# Patient Record
Sex: FEMALE | Race: WHITE | Employment: UNEMPLOYED | URBAN - METROPOLITAN AREA
[De-identification: names, ages, dates, MRNs, and addresses within clinical notes are randomized per-mention and may not be internally consistent; named-entity substitution may affect disease eponyms.]

---

## 2017-10-05 ENCOUNTER — OFFICE VISIT (OUTPATIENT)
Dept: OBGYN | Age: 57
End: 2017-10-05

## 2017-10-05 VITALS
WEIGHT: 182 LBS | DIASTOLIC BLOOD PRESSURE: 76 MMHG | HEIGHT: 64 IN | SYSTOLIC BLOOD PRESSURE: 118 MMHG | BODY MASS INDEX: 31.07 KG/M2

## 2017-10-05 DIAGNOSIS — Z12.31 SCREENING MAMMOGRAM, ENCOUNTER FOR: ICD-10-CM

## 2017-10-05 DIAGNOSIS — Z11.51 SCREENING FOR HPV (HUMAN PAPILLOMAVIRUS): ICD-10-CM

## 2017-10-05 DIAGNOSIS — Z01.419 WELL WOMAN EXAM WITH ROUTINE GYNECOLOGICAL EXAM: Primary | ICD-10-CM

## 2017-10-05 PROCEDURE — 99396 PREV VISIT EST AGE 40-64: CPT | Performed by: OBSTETRICS & GYNECOLOGY

## 2017-10-05 NOTE — MR AVS SNAPSHOT
After Visit Summary             Jackelyn Garcia   10/5/2017 12:40 PM   Office Visit    Description:  Female : 1960   Provider:  Brenda Pearson MD   Department:  [facility]              Your Follow-Up and Future Appointments         Below is a list of your follow-up and future appointments. This may not be a complete list as you may have made appointments directly with providers that we are not aware of or your providers may have made some for you. Please call your providers to confirm appointments. It is important to keep your appointments. Please bring your current insurance card, photo ID, co-pay, and all medication bottles to your appointment. If self-pay, payment is expected at the time of service. Your To-Do List     Future Appointments Provider Department Dept Phone    10/8/2018 1:00 PM Brenda Pearson MD formerly [facility] [phone]    Future Orders Complete By Expires    EYAL DIGITAL SCREEN W CAD BILATERAL [ Custom]  10/5/2017 2018    PAP SMEAR [LAB4 Custom]  10/5/2017 10/5/2018    Comments:    Patient History:    No LMP recorded. Patient is postmenopausal.  OBGYN Status: Postmenopausal  Past Surgical History:  No date:  SECTION      Smoking status: Current Every Day Smoker                                                   Packs/day: 0.00      Years: 0.00         Types: Cigarettes  Smokeless status: Never Used                          Follow-Up    Return in about 1 year (around 10/5/2018) for annual.         Information from Your Visit        Department     Name Address Phone Fax    [facility] [address] [phone]      You Were Seen for:         Comments    Well woman exam with routine gynecological exam   [[id number]]         Vital Signs     Blood Pressure Height Weight Breastfeeding?  Body Mass Index Smoking Status    118/76 5' 4\" (1.626 m) 182 lb (82.6 kg) No 31.24 kg/m2 Current Every Day Smoker

## 2017-10-05 NOTE — PROGRESS NOTES
Other Topics Concern    Not on file     Social History Narrative         MEDICATIONS:  No current outpatient prescriptions on file prior to visit. No current facility-administered medications on file prior to visit. ALLERGIES:  Allergies as of 10/05/2017 - Review Complete 10/05/2017   Allergen Reaction Noted    Pcn [penicillins]  04/21/2015           Gynecologic History:     No LMP recorded. Patient is postmenopausal.      ________________________________________________________________________  REVIEW OF SYSTEMS:       A minimum of an eleven point review of systems was completed. Review Of Systems (11 point):  Constitutional: No fever, chills or malaise; No weight change or fatigue  Head and Eyes: No vision, Headache, Dizziness or trauma in last 12 months  ENT ROS: No hearing, Tinnitis, sinus or taste problems  Hematological and Lymphatic ROS: No Lymphoma, Von Willebrand's, Hemophillia or Bleeding History  Psych ROS: No Depression, Homicidal thoughts,suicidal thoughts, or anxiety  Breast ROS: No prior breast abnormalities or lumps  Respiratory ROS: No SOB, Pneumoniae,Cough, or Pulmonary Embolism History  Cardiovascular ROS: No Chest Pain with Exertion, Palpitations, Syncope, Edema, Arrhythmia  Gastrointestinal ROS: No Indigestion, Heartburn, Nausea, Vomiting, Diarrhea, Constipation,or Bowel Changes; No Bloody Stools or melena  Genito-Urinary ROS: No Dysuria, Hematuria or Nocturia.  No Urinary Incontinence or Vaginal Discharge  Musculoskeletal ROS: No Arthralgia, Gout,Osteoporosis or Rheumatism  Neurological ROS: No CVA, Migraines, Epilepsy, Seizure Hx, or Limb Weakness  Dermatological ROS: No Rash, Itching, Hives, Mole Changes or Cancer                                                                                                                                                 PHYSICAL Exam:    Constitutional:  Vitals:    10/05/17 1302   BP: 118/76   Weight: 182 lb (82.6 kg)   Height: 5' 4\"

## 2017-10-12 DIAGNOSIS — Z11.51 SCREENING FOR HPV (HUMAN PAPILLOMAVIRUS): ICD-10-CM

## 2017-10-12 DIAGNOSIS — Z01.419 WELL WOMAN EXAM WITH ROUTINE GYNECOLOGICAL EXAM: ICD-10-CM

## 2018-10-08 ENCOUNTER — OFFICE VISIT (OUTPATIENT)
Dept: OBGYN CLINIC | Age: 58
End: 2018-10-08
Payer: COMMERCIAL

## 2018-10-08 VITALS
BODY MASS INDEX: 29.02 KG/M2 | DIASTOLIC BLOOD PRESSURE: 70 MMHG | SYSTOLIC BLOOD PRESSURE: 124 MMHG | HEIGHT: 64 IN | WEIGHT: 170 LBS

## 2018-10-08 DIAGNOSIS — Z12.11 COLON CANCER SCREENING: ICD-10-CM

## 2018-10-08 DIAGNOSIS — Z12.31 SCREENING MAMMOGRAM, ENCOUNTER FOR: ICD-10-CM

## 2018-10-08 DIAGNOSIS — Z11.51 SCREENING FOR HPV (HUMAN PAPILLOMAVIRUS): ICD-10-CM

## 2018-10-08 DIAGNOSIS — Z01.419 WELL WOMAN EXAM WITH ROUTINE GYNECOLOGICAL EXAM: Primary | ICD-10-CM

## 2018-10-08 PROCEDURE — 99396 PREV VISIT EST AGE 40-64: CPT | Performed by: OBSTETRICS & GYNECOLOGY

## 2018-10-08 ASSESSMENT — PATIENT HEALTH QUESTIONNAIRE - PHQ9
SUM OF ALL RESPONSES TO PHQ QUESTIONS 1-9: 0
SUM OF ALL RESPONSES TO PHQ9 QUESTIONS 1 & 2: 0
SUM OF ALL RESPONSES TO PHQ QUESTIONS 1-9: 0
1. LITTLE INTEREST OR PLEASURE IN DOING THINGS: 0
2. FEELING DOWN, DEPRESSED OR HOPELESS: 0

## 2018-10-08 ASSESSMENT — ENCOUNTER SYMPTOMS
WHEEZING: 0
NAUSEA: 0
BLOOD IN STOOL: 0
CONSTIPATION: 0
ABDOMINAL PAIN: 0
VOMITING: 0
SINUS PRESSURE: 0
COLOR CHANGE: 0
SHORTNESS OF BREATH: 0
COUGH: 0

## 2018-10-08 NOTE — PROGRESS NOTES
History and Physical  [facility]   [address]  P: [phone] / F: [phone]  Srinath Pritchett  10/8/2018              59 y.o. Chief Complaint   Patient presents with    Annual Exam     last pap 10-9-17,wnl no complaints.  Student     ok        /70   Ht 5' 4\" (1.626 m)   Wt 170 lb (77.1 kg)   BMI 29.18 kg/m²   Alllergies:  Pcn [penicillins]               Primary Care Physician: Magdi Yang MD    HPI : Srinath Pritchett is a 59 y.o. female [id number] The patient was seen and examined. She has no chief complaint today and is here for her annual exam.  Her bowels are regular. There are no voiding complaints. She denies any bloating. She denies vaginal discharge and was counseled on STD's and the need for barrier contraception. All ?s answered. .  ________________________________________________________________________  Obstetric History       T0      L4     SAB0   TAB1   Ectopic0   Molar0   Multiple0   Live Births4       # Outcome Date GA Lbr Jose/2nd Weight Sex Delivery Anes PTL Lv   5      F CS-LTranv   SONAL   4      F Vag-Spont   SONAL   3      F Vag-Spont   SONAL   2      M Vag-Spont   SONAL   1 TAB                 History reviewed. No pertinent past medical history. Past Surgical History:   Procedure Laterality Date     SECTION       Family History   Problem Relation Age of Onset   [facility] Breast Cancer Mother          at 76     Social History     Social History    Marital status:      Spouse name: N/A    Number of children: N/A    Years of education: N/A     Occupational History    Not on file.      Social History Main Topics    Smoking status: Current Every Day Smoker     Types: Cigarettes    Smokeless tobacco: Never Used    Alcohol use 0.0 oz/week      Comment: occasionally  Drug use: No    Sexual activity: Yes     Partners: Male     Other Topics Concern    Not on file     Social History Narrative    No narrative on file         MEDICATIONS:  No current outpatient prescriptions on file prior to visit. No current facility-administered medications on file prior to visit. ALLERGIES:  Allergies as of 10/08/2018 - Review Complete 10/08/2018   Allergen Reaction Noted    Pcn [penicillins]  04/21/2015           Gynecologic History:     No LMP recorded. Patient is postmenopausal.      ________________________________________________________________________  REVIEW OF SYSTEMS:       Review of Systems   Constitutional: Negative for chills, fatigue, fever and unexpected weight change. HENT: Negative for hearing loss, sinus pressure and tinnitus. Eyes: Negative for visual disturbance. Respiratory: Negative for cough, shortness of breath and wheezing. Cardiovascular: Negative for chest pain, palpitations and leg swelling. Gastrointestinal: Negative for abdominal pain, blood in stool, constipation, nausea and vomiting. Genitourinary: Negative for difficulty urinating, dysuria, flank pain, hematuria, pelvic pain and vaginal discharge. Musculoskeletal: Negative for arthralgias and neck stiffness. Skin: Negative for color change, pallor and rash. Allergic/Immunologic: Negative for food allergies. Neurological: Negative for dizziness, speech difficulty, weakness and numbness. Psychiatric/Behavioral: Negative for behavioral problems, self-injury and suicidal ideas. The patient is not nervous/anxious. PHYSICAL Exam:    Constitutional:  Vitals:    10/08/18 1318   BP: 124/70   Weight: 170 lb (77.1 kg)   Height: 5' 4\" (1.626 m)       Physical Exam   Constitutional: She is oriented to person, place, and time.  She appears

## 2018-10-17 DIAGNOSIS — Z11.51 SCREENING FOR HPV (HUMAN PAPILLOMAVIRUS): ICD-10-CM

## 2018-10-17 DIAGNOSIS — Z01.419 WELL WOMAN EXAM WITH ROUTINE GYNECOLOGICAL EXAM: ICD-10-CM

## 2018-11-07 ENCOUNTER — TELEPHONE (OUTPATIENT)
Dept: ENDOSCOPY | Age: 58
End: 2018-11-07

## 2018-11-28 ENCOUNTER — OUTSIDE SERVICES (OUTPATIENT)
Dept: GASTROENTEROLOGY | Age: 58
End: 2018-11-28
Payer: COMMERCIAL

## 2018-11-28 DIAGNOSIS — Z12.11 COLON CANCER SCREENING: Primary | ICD-10-CM

## 2018-11-28 PROCEDURE — 45385 COLONOSCOPY W/LESION REMOVAL: CPT | Performed by: INTERNAL MEDICINE

## 2018-11-28 NOTE — OP NOTE
Colonoscopy Procedure Note      Patient: Lara Saxena  : 1960    Procedure: Colonoscopy with polypectomy (cold snare)    Date:  2018    Surgeon:  Mo Hanna MD    Referring Physician:  Sheron Stewart MD    Preoperative Diagnosis:  Screening colonoscopy    Postoperative Diagnosis:  5 mm ascending colon polyp, otherwise normal colon    Consent:  The patient or their legal guardian has signed a consent, and is aware of the potential risks, benefits, alternatives, and potential complications of this procedure. These include, but are not limited to hemorrhage, bleeding, post procedural pain, perforation, phlebitis, aspiration, hypotension, hypoxia, cardiovascular events such as arryhthmia, and possibly death. Additionally, the possibility of missed colonic polyps and interval colon cancer was discussed in the consent. Anesthesia:  MAC    Procedure: An informed consent was obtained from the patient after explanation of indications, benefits, possible risks and complications of the procedure. The patient was then taken to the endoscopy suite, placed in the left lateral decubitus position, and the above IV anesthesia was administered. A digital rectal examination was performed and revealed negative without mass, lesions or tenderness. The Olympus video colonoscope was placed in the patient's rectum under digital direction and advanced to the cecum. The cecum was identified by characteristic anatomy and confirmed with presence ileo-cecal valve, appendical orifice and transillumination of right lower quadrant. Photo documentation of cecum was performed. The prep was good. The scope was then withdrawn back through the cecum, ascending, transverse, descending, sigmoid colon, and rectum.   Careful circumferential examination of the mucosa in these areas demonstrated:    FINDINGS:  Cecum: Normal.  Ascending Colon: 5 mm sessile polyp noted, removed completely with cold snare, specimen sent

## 2023-11-03 DIAGNOSIS — I10 ESSENTIAL (PRIMARY) HYPERTENSION: ICD-10-CM

## 2023-11-13 DIAGNOSIS — Z12.31 BREAST CANCER SCREENING BY MAMMOGRAM: ICD-10-CM

## 2023-11-20 DIAGNOSIS — I10 BENIGN ESSENTIAL HYPERTENSION: ICD-10-CM

## 2023-11-20 PROBLEM — R05.9 COUGH: Status: ACTIVE | Noted: 2023-11-20

## 2023-11-20 PROBLEM — N90.7 VULVAR CYST: Status: ACTIVE | Noted: 2023-11-20

## 2023-11-20 PROBLEM — E66.3 OVERWEIGHT (BMI 25.0-29.9): Status: ACTIVE | Noted: 2023-11-20

## 2023-11-20 PROBLEM — F17.200 CURRENT EVERY DAY SMOKER: Status: ACTIVE | Noted: 2023-11-20

## 2023-11-20 PROBLEM — R21 RASH: Status: ACTIVE | Noted: 2023-11-20

## 2023-11-20 PROBLEM — R00.2 PALPITATIONS: Status: ACTIVE | Noted: 2023-11-20

## 2023-11-20 PROBLEM — R92.30 DENSE BREAST TISSUE ON MAMMOGRAM: Status: ACTIVE | Noted: 2023-11-20

## 2023-11-20 PROBLEM — J20.9 ACUTE BRONCHITIS: Status: RESOLVED | Noted: 2023-11-20 | Resolved: 2023-11-20

## 2023-11-20 RX ORDER — ERGOCALCIFEROL 1.25 MG/1
1 CAPSULE ORAL
COMMUNITY
Start: 2023-05-08 | End: 2023-12-12 | Stop reason: ALTCHOICE

## 2023-11-20 RX ORDER — LOSARTAN POTASSIUM 25 MG/1
25 TABLET ORAL DAILY
COMMUNITY
Start: 2023-08-03 | End: 2023-11-22 | Stop reason: SDUPTHER

## 2023-11-21 RX ORDER — LOSARTAN POTASSIUM 25 MG/1
25 TABLET ORAL DAILY
Qty: 90 TABLET | Refills: 3 | OUTPATIENT
Start: 2023-11-21

## 2023-11-21 RX ORDER — LOSARTAN POTASSIUM 25 MG/1
25 TABLET ORAL DAILY
Qty: 90 TABLET | Refills: 3 | OUTPATIENT
Start: 2023-11-21 | End: 2024-11-20

## 2023-11-22 DIAGNOSIS — I10 BENIGN ESSENTIAL HYPERTENSION: ICD-10-CM

## 2023-11-22 RX ORDER — LOSARTAN POTASSIUM 25 MG/1
25 TABLET ORAL DAILY
Qty: 90 TABLET | Refills: 3 | Status: SHIPPED | OUTPATIENT
Start: 2023-11-22 | End: 2024-02-21 | Stop reason: ALTCHOICE

## 2023-12-12 ENCOUNTER — OFFICE VISIT (OUTPATIENT)
Dept: PRIMARY CARE | Facility: CLINIC | Age: 63
End: 2023-12-12
Payer: MEDICARE

## 2023-12-12 VITALS
OXYGEN SATURATION: 99 % | WEIGHT: 175.2 LBS | BODY MASS INDEX: 29.91 KG/M2 | HEART RATE: 61 BPM | TEMPERATURE: 96.9 F | RESPIRATION RATE: 16 BRPM | DIASTOLIC BLOOD PRESSURE: 82 MMHG | SYSTOLIC BLOOD PRESSURE: 138 MMHG | HEIGHT: 64 IN

## 2023-12-12 DIAGNOSIS — I10 BENIGN ESSENTIAL HYPERTENSION: ICD-10-CM

## 2023-12-12 DIAGNOSIS — Z13.220 LIPID SCREENING: ICD-10-CM

## 2023-12-12 DIAGNOSIS — E53.8 VITAMIN B12 DEFICIENCY: ICD-10-CM

## 2023-12-12 PROBLEM — Z76.89 ENCOUNTER TO ESTABLISH CARE WITH NEW DOCTOR: Status: ACTIVE | Noted: 2023-12-12

## 2023-12-12 PROCEDURE — 1160F RVW MEDS BY RX/DR IN RCRD: CPT | Performed by: FAMILY MEDICINE

## 2023-12-12 PROCEDURE — 1159F MED LIST DOCD IN RCRD: CPT | Performed by: FAMILY MEDICINE

## 2023-12-12 PROCEDURE — 99213 OFFICE O/P EST LOW 20 MIN: CPT | Performed by: FAMILY MEDICINE

## 2023-12-12 PROCEDURE — 3078F DIAST BP <80 MM HG: CPT | Performed by: FAMILY MEDICINE

## 2023-12-12 PROCEDURE — 3075F SYST BP GE 130 - 139MM HG: CPT | Performed by: FAMILY MEDICINE

## 2023-12-12 PROCEDURE — 1036F TOBACCO NON-USER: CPT | Performed by: FAMILY MEDICINE

## 2023-12-12 NOTE — PROGRESS NOTES
"Subjective   Patient ID: Sommer Lewis is a 63 y.o. female who presents for Establish Care and thumb issues.  HPI    Establish care  Last PCP Dr Armen Le  Reason for leaving retired  How did you hear about us pt daughter comes here     Due for labs end of January     Taking current medications which were reviewed.  Problem list discussed.    Pt is having right thumb pain  Ongoing for 1 year  Pt states that the gets very sore and shaky     Overall doing well.  Eating okay.  Staying active.    Has no other new problem /question.     ROS  Constitutional- No activity change. No appetite change.  Eyes- Denies vision changes.  Respiratory- No shortness of breath.  Cardiovascular- No palpitations. No chest pain.  GI- No nausea or vomiting. No diarrhea or constipation. Denies abdominal pain.  Musculoskeletal- Denies joint swelling.  Extremities- No edema.  Neurological- Denies headaches. Denies dizziness.  Skin- No rashes.  Psychiatric/Behavioral- Denies significant anxiety, or depressed mood.     Objective     /82   Pulse 61   Temp 36.1 °C (96.9 °F) (Temporal)   Resp 16   Ht 1.626 m (5' 4\")   Wt 79.5 kg (175 lb 3.2 oz)   SpO2 99%   BMI 30.07 kg/m²     Allergies   Allergen Reactions    Azithromycin Unknown    Moxifloxacin Unknown    Penicillins Unknown       Constitutional-- Well-nourished.  No distress  Head- unremarkable.  Eyes- PERRL.  Conjunctiva normal.  Nose- Normal.  No rhinorrhea noted.  Throat- Oropharynx is clear and moist.  Neck- Supple with no thyromegaly.  No significant cervical adenopathy noted.  Pulmonary/Chest- Breath sounds normal with normal effort.  No wheezing.  Heart- Regular rate and rhythm.  No murmur.  Abdomen- Soft and non-tender.  No masses noted.  Musculoskeletal- Normal ROM.  No significant joint swelling  Extremities- No edema.   Neurological- Alert.  No noted deficits.  Skin- Warm.  No rashes.  Psychiatric/Behavioral- Mood and affect normal.  Behavior normal. "     Assessment/Plan   1. Benign essential hypertension  CBC and Auto Differential    Comprehensive Metabolic Panel      2. Lipid screening  Lipid Panel      3. Vitamin B12 deficiency               Long talk. Treatment options reviewed.    Discussed health maintenance. Educated the patient on preventive care.    Discussed thumb pain.  Will continue to monitor.      Advised patient to maintain appointments with specialists as scheduled.      Continue and take your medications as prescribed.    Health Maintenance issues discussed.    Importance of healthy diet and regular exercise regimen discussed.    We will contact you with any test results ordered. If you do not hear from us, please contact.    Follow-up as instructed or sooner if any problems or symptoms do not resolve as expected.    Scribe Attestation  By signing my name below, Baylee MARKS Scribe   attest that this documentation has been prepared under the direction and in the presence of Adin Jones MD.

## 2023-12-12 NOTE — PROGRESS NOTES
Subjective   Patient ID: Sommer Lewis is a 63 y.o. female who presents for No chief complaint on file..  [facility]    Establish care  Last PCP --  Reason for leaving --  How did you hear about us --     Due for labs end of January     Taking current medications which were reviewed.  Problem list discussed.    Overall doing well.  Eating okay.  Staying active.    Has no other new problem /question.     ROS  Constitutional- No activity change. No appetite change.  Eyes- Denies vision changes.  Respiratory- No shortness of breath.  Cardiovascular- No palpitations. No chest pain.  GI- No nausea or vomiting. No diarrhea or constipation. Denies abdominal pain.  Musculoskeletal- Denies joint swelling.  Extremities- No edema.  Neurological- Denies headaches. Denies dizziness.  Skin- No rashes.  Psychiatric/Behavioral- Denies significant anxiety, or depressed mood.     Objective     There were no vitals taken for this visit.    Allergies   Allergen Reactions   • Azithromycin Unknown   • Moxifloxacin Unknown   • Penicillins Unknown       Constitutional-- Well-nourished.  No distress  Head- unremarkable.  Ears- TMs clear.  Eyes- PERRL.  Conjunctiva normal.  Nose- Normal.  No rhinorrhea noted.  Throat- Oropharynx is clear and moist.  Neck- Supple with no thyromegaly.  No significant cervical adenopathy noted.  Pulmonary/Chest- Breath sounds normal with normal effort.  No wheezing.  Heart- Regular rate and rhythm.  No murmur.  Abdomen- Soft and non-tender.  No masses noted.  Musculoskeletal- Normal ROM.  No significant joint swelling  Extremities- No edema.   Neurological- Alert.  No noted deficits.  Skin- Warm.  No rashes.  Psychiatric/Behavioral- Mood and affect normal.  Behavior normal.     Assessment/Plan   No diagnosis found.       Long talk. Treatment options reviewed.    Continue and take your medications as prescribed.    Health Maintenance issues discussed.    Importance of healthy diet and regular exercise regimen  discussed.    We will contact you with any test results ordered. If you do not hear from us, please contact.    Follow-up as instructed or sooner if any problems or symptoms do not resolve as expected.

## 2024-01-10 ENCOUNTER — HOSPITAL ENCOUNTER (OUTPATIENT)
Dept: RADIOLOGY | Facility: HOSPITAL | Age: 64
Discharge: HOME | End: 2024-01-10
Payer: MEDICARE

## 2024-01-10 DIAGNOSIS — Z12.31 BREAST CANCER SCREENING BY MAMMOGRAM: ICD-10-CM

## 2024-01-10 PROCEDURE — 77067 SCR MAMMO BI INCL CAD: CPT | Performed by: RADIOLOGY

## 2024-01-10 PROCEDURE — 77063 BREAST TOMOSYNTHESIS BI: CPT | Performed by: RADIOLOGY

## 2024-01-10 PROCEDURE — 77067 SCR MAMMO BI INCL CAD: CPT

## 2024-01-11 ENCOUNTER — TELEPHONE (OUTPATIENT)
Dept: PRIMARY CARE | Facility: CLINIC | Age: 64
End: 2024-01-11
Payer: MEDICARE

## 2024-01-11 NOTE — TELEPHONE ENCOUNTER
----- Message from Adin Jones MD sent at 1/11/2024  7:53 AM EST -----  Mammogram is within normal limits.  Repeat in 1 year.  Please let her know and record

## 2024-02-21 ENCOUNTER — OFFICE VISIT (OUTPATIENT)
Dept: CARDIOLOGY | Facility: CLINIC | Age: 64
End: 2024-02-21
Payer: MEDICARE

## 2024-02-21 VITALS
WEIGHT: 180.9 LBS | HEIGHT: 65 IN | HEART RATE: 60 BPM | SYSTOLIC BLOOD PRESSURE: 150 MMHG | DIASTOLIC BLOOD PRESSURE: 80 MMHG | BODY MASS INDEX: 30.14 KG/M2

## 2024-02-21 DIAGNOSIS — Z87.891 FORMER SMOKER: ICD-10-CM

## 2024-02-21 DIAGNOSIS — R09.89 BRUIT OF RIGHT CAROTID ARTERY: ICD-10-CM

## 2024-02-21 DIAGNOSIS — I10 BENIGN ESSENTIAL HYPERTENSION: ICD-10-CM

## 2024-02-21 DIAGNOSIS — Z13.220 SCREENING CHOLESTEROL LEVEL: ICD-10-CM

## 2024-02-21 DIAGNOSIS — R00.2 PALPITATIONS: ICD-10-CM

## 2024-02-21 PROBLEM — F17.200 CURRENT EVERY DAY SMOKER: Status: RESOLVED | Noted: 2023-11-20 | Resolved: 2024-02-21

## 2024-02-21 PROCEDURE — 99214 OFFICE O/P EST MOD 30 MIN: CPT | Performed by: INTERNAL MEDICINE

## 2024-02-21 PROCEDURE — 3079F DIAST BP 80-89 MM HG: CPT | Performed by: INTERNAL MEDICINE

## 2024-02-21 PROCEDURE — 1159F MED LIST DOCD IN RCRD: CPT | Performed by: INTERNAL MEDICINE

## 2024-02-21 PROCEDURE — 1036F TOBACCO NON-USER: CPT | Performed by: INTERNAL MEDICINE

## 2024-02-21 PROCEDURE — 3077F SYST BP >= 140 MM HG: CPT | Performed by: INTERNAL MEDICINE

## 2024-02-21 RX ORDER — LANOLIN ALCOHOL/MO/W.PET/CERES
1000 CREAM (GRAM) TOPICAL WEEKLY
COMMUNITY

## 2024-02-21 RX ORDER — LOSARTAN POTASSIUM 50 MG/1
50 TABLET ORAL DAILY
Qty: 90 TABLET | Refills: 3 | Status: SHIPPED | OUTPATIENT
Start: 2024-02-21 | End: 2024-05-29 | Stop reason: SDUPTHER

## 2024-02-21 ASSESSMENT — ENCOUNTER SYMPTOMS
CARDIOVASCULAR NEGATIVE: 1
RESPIRATORY NEGATIVE: 1
CONSTITUTIONAL NEGATIVE: 1
NEUROLOGICAL NEGATIVE: 1

## 2024-02-21 NOTE — PROGRESS NOTES
CARDIOLOGY OFFICE VISIT      CHIEF COMPLAINT  Chief Complaint   Patient presents with    Follow-up     1 year follow up        HISTORY OF PRESENT ILLNESS  Sommer Lewis is a 63 y.o. year old female patient with history of benign essential hypertension and tobacco use who recently stopped smoking about 3 months ago.  She has a history of significant electrolyte abnormalities including hyponatremia and hypokalemia secondary to thiazide diuretics.  She has been doing well with no chest pain or significant shortness of breath.  She also denies orthopnea proximal nocturnal dyspnea.  Last lipid profile shows LDL is 95 with an HDL of 58 and triglycerides 51    ASSESSMENT AND PLAN  1.  Hypertension: Unfortunately blood pressure is suboptimally controlled since she gained some weight after stopping smoking.  We will increase losartan to 50 mg daily and follow-up for blood pressure check as scheduled.  2.  Overweight: She is encouraged to lose weight and exercise.    Problem List Items Addressed This Visit       Benign essential hypertension    Palpitations    Former smoker     Other Visit Diagnoses       Bruit of right carotid artery        Screening cholesterol level                Recent Cardiovascular Testing:    Echo-  Stress-  Cath-  Carotid Ultrasound-    Past Medical History  Past Medical History:   Diagnosis Date    Acute bronchitis 2023    Hypertension     Other specified symptoms and signs involving the circulatory and respiratory systems     Labile blood pressure    Personal history of other diseases of the respiratory system 2022    History of acute bronchitis       Social History  Social History     Tobacco Use    Smoking status: Former     Packs/day: 1.00     Years: 40.00     Additional pack years: 0.00     Total pack years: 40.00     Types: Cigarettes     Quit date: 2023     Years since quittin.2    Smokeless tobacco: Never   Substance Use Topics    Alcohol use: Not Currently     "Drug use: Never       Family History     Family History   Problem Relation Name Age of Onset    Breast cancer Mother      Diabetes Father          Allergies:  Allergies   Allergen Reactions    Azithromycin Unknown    Hydrochlorothiazide Other    Moxifloxacin Unknown    Penicillins Unknown        Outpatient Medications:  Current Outpatient Medications   Medication Instructions    cyanocobalamin (VITAMIN B-12) 1,000 mcg, oral, Weekly        Recent Lab Results:    CBC:   Lab Results   Component Value Date    WBC 8.3 01/26/2023    RBC 4.74 01/26/2023    HGB 14.6 01/26/2023    HCT 44.4 01/26/2023     01/26/2023        CMP:    Lab Results   Component Value Date     01/26/2023    K 3.8 01/26/2023     01/26/2023    CO2 29 01/26/2023    BUN 8 01/26/2023    CREATININE 0.68 01/26/2023    GLUCOSE 86 01/26/2023    CALCIUM 9.8 01/26/2023       Magnesium:    No results found for: \"MG\"    Lipid Profile:    Lab Results   Component Value Date    TRIG 51 08/30/2021    HDL 58.0 08/30/2021       TSH:    Lab Results   Component Value Date    TSH 2.47 01/26/2023       BNP:   No results found for: \"BNP\"     PT/INR:    No results found for: \"PROTIME\", \"INR\"    HgBA1c:    No results found for: \"HGBA1C\"    BMP:  Lab Results   Component Value Date     01/26/2023     10/04/2022     (L) 07/28/2022    K 3.8 01/26/2023    K 4.1 10/04/2022    K 3.2 (L) 07/28/2022     01/26/2023     (H) 10/04/2022    CL 97 (L) 07/28/2022    CO2 29 01/26/2023    CO2 25 10/04/2022    CO2 27 07/28/2022    BUN 8 01/26/2023    BUN 7 10/04/2022    BUN 11 07/28/2022    CREATININE 0.68 01/26/2023    CREATININE 0.75 10/04/2022    CREATININE 0.70 07/28/2022       CBC:  Lab Results   Component Value Date    WBC 8.3 01/26/2023    WBC 7.1 07/28/2022    RBC 4.74 01/26/2023    RBC 4.34 07/28/2022    HGB 14.6 01/26/2023    HGB 13.3 07/28/2022    HCT 44.4 01/26/2023    HCT 39.8 07/28/2022    MCV 94 01/26/2023    MCV 92 07/28/2022    " "MCHC 32.9 01/26/2023    MCHC 33.4 07/28/2022    RDW 13.3 01/26/2023    RDW 12.7 07/28/2022     01/26/2023     07/28/2022       Cardiac Enzymes:    No results found for: \"TROPHS\"    Hepatic Function Panel:    Lab Results   Component Value Date    ALKPHOS 84 01/26/2023    ALT 12 01/26/2023    AST 15 01/26/2023    PROT 7.2 01/26/2023    BILITOT 0.6 01/26/2023         REVIEW OF SYSTEMS  Review of Systems   Constitutional: Negative.   Cardiovascular: Negative.    Respiratory: Negative.     Neurological: Negative.    All other systems reviewed and are negative.      VITALS  Vitals:    02/21/24 1008   BP: 150/80   Pulse: 60     Wt Readings from Last 4 Encounters:   02/21/24 82.1 kg (180 lb 14.4 oz)   12/12/23 79.5 kg (175 lb 3.2 oz)   02/21/23 72.1 kg (159 lb)   12/21/22 70.9 kg (156 lb 6.4 oz)       PHYSICAL EXAM  Constitutional:       Appearance: Healthy appearance. Not in distress.   Eyes:      Conjunctiva/sclera: Conjunctivae normal.      Pupils: Pupils are equal, round, and reactive to light.   Neck:      Vascular: No JVR. JVD normal.      Comments: Right sided carotid bruit  Pulmonary:      Effort: Pulmonary effort is normal.      Breath sounds: Normal breath sounds. No wheezing. No rhonchi. No rales.   Chest:      Chest wall: Not tender to palpatation.   Cardiovascular:      PMI at left midclavicular line. Normal rate. Regular rhythm. Normal S1. Normal S2.       Murmurs: There is no murmur.      No gallop.  No click. No rub.   Pulses:     Intact distal pulses.   Edema:     Peripheral edema absent.   Abdominal:      Tenderness: There is no abdominal tenderness.   Musculoskeletal: Normal range of motion.         General: No tenderness.      Cervical back: Normal range of motion. Skin:     General: Skin is warm and dry.   Neurological:      General: No focal deficit present.      Mental Status: Alert and oriented to person, place and time.             "

## 2024-02-22 ENCOUNTER — LAB (OUTPATIENT)
Dept: LAB | Facility: LAB | Age: 64
End: 2024-02-22
Payer: MEDICARE

## 2024-02-22 DIAGNOSIS — I10 BENIGN ESSENTIAL HYPERTENSION: ICD-10-CM

## 2024-02-22 DIAGNOSIS — Z13.220 LIPID SCREENING: ICD-10-CM

## 2024-02-22 LAB
ALBUMIN SERPL BCP-MCNC: 4.2 G/DL (ref 3.4–5)
ALP SERPL-CCNC: 84 U/L (ref 33–136)
ALT SERPL W P-5'-P-CCNC: 21 U/L (ref 7–45)
ANION GAP SERPL CALC-SCNC: 12 MMOL/L (ref 10–20)
AST SERPL W P-5'-P-CCNC: 22 U/L (ref 9–39)
BASOPHILS # BLD AUTO: 0.09 X10*3/UL (ref 0–0.1)
BASOPHILS NFR BLD AUTO: 1.2 %
BILIRUB SERPL-MCNC: 0.3 MG/DL (ref 0–1.2)
BUN SERPL-MCNC: 14 MG/DL (ref 6–23)
CALCIUM SERPL-MCNC: 9.9 MG/DL (ref 8.6–10.3)
CHLORIDE SERPL-SCNC: 107 MMOL/L (ref 98–107)
CHOLEST SERPL-MCNC: 158 MG/DL (ref 0–199)
CHOLESTEROL/HDL RATIO: 2.8
CO2 SERPL-SCNC: 26 MMOL/L (ref 21–32)
CREAT SERPL-MCNC: 0.84 MG/DL (ref 0.5–1.05)
EGFRCR SERPLBLD CKD-EPI 2021: 77 ML/MIN/1.73M*2
EOSINOPHIL # BLD AUTO: 0.39 X10*3/UL (ref 0–0.7)
EOSINOPHIL NFR BLD AUTO: 5.2 %
ERYTHROCYTE [DISTWIDTH] IN BLOOD BY AUTOMATED COUNT: 12.2 % (ref 11.5–14.5)
GLUCOSE SERPL-MCNC: 94 MG/DL (ref 74–99)
HCT VFR BLD AUTO: 45.4 % (ref 36–46)
HDLC SERPL-MCNC: 56 MG/DL
HGB BLD-MCNC: 15.2 G/DL (ref 12–16)
IMM GRANULOCYTES # BLD AUTO: 0.02 X10*3/UL (ref 0–0.7)
IMM GRANULOCYTES NFR BLD AUTO: 0.3 % (ref 0–0.9)
LDLC SERPL CALC-MCNC: 87 MG/DL
LYMPHOCYTES # BLD AUTO: 2.64 X10*3/UL (ref 1.2–4.8)
LYMPHOCYTES NFR BLD AUTO: 35.2 %
MCH RBC QN AUTO: 31.9 PG (ref 26–34)
MCHC RBC AUTO-ENTMCNC: 33.5 G/DL (ref 32–36)
MCV RBC AUTO: 95 FL (ref 80–100)
MONOCYTES # BLD AUTO: 0.63 X10*3/UL (ref 0.1–1)
MONOCYTES NFR BLD AUTO: 8.4 %
NEUTROPHILS # BLD AUTO: 3.73 X10*3/UL (ref 1.2–7.7)
NEUTROPHILS NFR BLD AUTO: 49.7 %
NON HDL CHOLESTEROL: 102 MG/DL (ref 0–149)
NRBC BLD-RTO: 0 /100 WBCS (ref 0–0)
PLATELET # BLD AUTO: 253 X10*3/UL (ref 150–450)
POTASSIUM SERPL-SCNC: 4.3 MMOL/L (ref 3.5–5.3)
PROT SERPL-MCNC: 7 G/DL (ref 6.4–8.2)
RBC # BLD AUTO: 4.76 X10*6/UL (ref 4–5.2)
SODIUM SERPL-SCNC: 141 MMOL/L (ref 136–145)
TRIGL SERPL-MCNC: 75 MG/DL (ref 0–149)
VLDL: 15 MG/DL (ref 0–40)
WBC # BLD AUTO: 7.5 X10*3/UL (ref 4.4–11.3)

## 2024-02-22 PROCEDURE — 80061 LIPID PANEL: CPT

## 2024-02-22 PROCEDURE — 85025 COMPLETE CBC W/AUTO DIFF WBC: CPT

## 2024-02-22 PROCEDURE — 80053 COMPREHEN METABOLIC PANEL: CPT

## 2024-02-22 PROCEDURE — 36415 COLL VENOUS BLD VENIPUNCTURE: CPT

## 2024-02-26 ENCOUNTER — TELEPHONE (OUTPATIENT)
Dept: PRIMARY CARE | Facility: CLINIC | Age: 64
End: 2024-02-26
Payer: MEDICARE

## 2024-02-26 NOTE — TELEPHONE ENCOUNTER
----- Message from Adin Jones MD sent at 2/24/2024 10:47 AM EST -----  Labs are within normal limits or stable.  Continue healthy diet and follow-up per routine.  Please let them know

## 2024-03-12 ENCOUNTER — HOSPITAL ENCOUNTER (OUTPATIENT)
Dept: CARDIOLOGY | Facility: CLINIC | Age: 64
Discharge: HOME | End: 2024-03-12
Payer: MEDICARE

## 2024-03-12 DIAGNOSIS — R09.89 BRUIT OF RIGHT CAROTID ARTERY: ICD-10-CM

## 2024-03-12 PROCEDURE — 93880 EXTRACRANIAL BILAT STUDY: CPT | Performed by: INTERNAL MEDICINE

## 2024-03-12 PROCEDURE — 93880 EXTRACRANIAL BILAT STUDY: CPT

## 2024-03-19 ENCOUNTER — TELEPHONE (OUTPATIENT)
Dept: CARDIOLOGY | Facility: CLINIC | Age: 64
End: 2024-03-19
Payer: MEDICARE

## 2024-03-19 NOTE — TELEPHONE ENCOUNTER
----- Message from Sergei Mayes MD sent at 3/18/2024  3:08 PM EDT -----  No hemodynamically significant carotid artery stenosis

## 2024-03-25 ENCOUNTER — OFFICE VISIT (OUTPATIENT)
Dept: PRIMARY CARE | Facility: CLINIC | Age: 64
End: 2024-03-25
Payer: MEDICARE

## 2024-03-25 VITALS
OXYGEN SATURATION: 98 % | WEIGHT: 183 LBS | RESPIRATION RATE: 16 BRPM | SYSTOLIC BLOOD PRESSURE: 146 MMHG | BODY MASS INDEX: 30.49 KG/M2 | TEMPERATURE: 96.9 F | HEIGHT: 65 IN | HEART RATE: 59 BPM | DIASTOLIC BLOOD PRESSURE: 74 MMHG

## 2024-03-25 DIAGNOSIS — E53.8 VITAMIN B12 DEFICIENCY: ICD-10-CM

## 2024-03-25 DIAGNOSIS — S39.012A STRAIN OF LUMBAR REGION, INITIAL ENCOUNTER: Primary | ICD-10-CM

## 2024-03-25 DIAGNOSIS — I10 BENIGN ESSENTIAL HYPERTENSION: ICD-10-CM

## 2024-03-25 DIAGNOSIS — M54.9 BACK PAIN, UNSPECIFIED BACK LOCATION, UNSPECIFIED BACK PAIN LATERALITY, UNSPECIFIED CHRONICITY: ICD-10-CM

## 2024-03-25 DIAGNOSIS — Z87.891 FORMER SMOKER: ICD-10-CM

## 2024-03-25 DIAGNOSIS — R00.2 PALPITATIONS: ICD-10-CM

## 2024-03-25 PROCEDURE — 1036F TOBACCO NON-USER: CPT | Performed by: FAMILY MEDICINE

## 2024-03-25 PROCEDURE — 99213 OFFICE O/P EST LOW 20 MIN: CPT | Performed by: FAMILY MEDICINE

## 2024-03-25 PROCEDURE — 3077F SYST BP >= 140 MM HG: CPT | Performed by: FAMILY MEDICINE

## 2024-03-25 PROCEDURE — 1124F ACP DISCUSS-NO DSCNMKR DOCD: CPT | Performed by: FAMILY MEDICINE

## 2024-03-25 PROCEDURE — 3008F BODY MASS INDEX DOCD: CPT | Performed by: FAMILY MEDICINE

## 2024-03-25 PROCEDURE — 1159F MED LIST DOCD IN RCRD: CPT | Performed by: FAMILY MEDICINE

## 2024-03-25 PROCEDURE — 1160F RVW MEDS BY RX/DR IN RCRD: CPT | Performed by: FAMILY MEDICINE

## 2024-03-25 PROCEDURE — 3078F DIAST BP <80 MM HG: CPT | Performed by: FAMILY MEDICINE

## 2024-03-25 RX ORDER — CYCLOBENZAPRINE HCL 10 MG
10 TABLET ORAL 3 TIMES DAILY PRN
Qty: 30 TABLET | Refills: 0 | Status: SHIPPED | OUTPATIENT
Start: 2024-03-25 | End: 2024-05-24

## 2024-03-25 RX ORDER — METHYLPREDNISOLONE 4 MG/1
TABLET ORAL
Qty: 21 TABLET | Refills: 0 | Status: SHIPPED | OUTPATIENT
Start: 2024-03-25

## 2024-03-25 NOTE — PROGRESS NOTES
"Subjective   Patient ID: Sommer Lewis is a 64 y.o. female who presents for Back Pain.  HPI    Patient in office being seen for lower right  Back pain   Ongoing for 1 week ago  Pt was doing exercises   Pain is described very sharp an stiff  Rates pain level today as   8/10  Pain does not radiate  Is taking Ibuprofen, ice , heat  -minimal relief    Overall doing well.  Eating okay.  Staying active.     Has no other new problem /question.  ROS  Constitutional- No activity change. No appetite change.  Eyes- Denies vision changes.  Respiratory- No shortness of breath.  Cardiovascular- No palpitations. No chest pain.  GI- No nausea or vomiting. No diarrhea or constipation. Denies abdominal pain.  Musculoskeletal- myalgias  Extremities- No edema.  Neurological- Denies headaches. Denies dizziness.  Skin- No rashes.  Psychiatric/Behavioral- Denies significant anxiety, or depressed mood.     Objective     /74 (BP Location: Left arm, Patient Position: Sitting, BP Cuff Size: Adult)   Pulse 59   Temp 36.1 °C (96.9 °F) (Temporal)   Resp 16   Ht 1.638 m (5' 4.5\")   Wt 83 kg (183 lb)   SpO2 98%   BMI 30.93 kg/m²     Allergies   Allergen Reactions    Azithromycin Unknown    Hydrochlorothiazide Other    Moxifloxacin Unknown    Penicillins Unknown       Constitutional-- Well-nourished.  No distress  Eyes- PERRL.  Conjunctiva normal.  Nose- Normal.  No rhinorrhea noted.  Throat- Oropharynx is clear and moist.  Neck- Supple with no thyromegaly.  No significant cervical adenopathy noted.  Pulmonary/Chest- Breath sounds normal with normal effort.  No wheezing.  Heart- Regular rate and rhythm.  No murmur.  Abdomen- Soft and non-tender.  No masses noted.  Musculoskeletal-low back pain worse on the right than the left exacerbated with range of motion.  Moderate low back spasm also noted.  Extremities- No edema.   Neurological- Alert.  No noted deficits.  Skin- Warm.   Psychiatric/Behavioral- Mood and affect normal.  " Behavior normal.     Assessment/Plan   1. Strain of lumbar region, initial encounter  methylPREDNISolone (Medrol Dospak) 4 mg tablets    cyclobenzaprine (Flexeril) 10 mg tablet      2. Benign essential hypertension        3. Palpitations        4. Vitamin B12 deficiency        5. Former smoker        6. Back pain, unspecified back location, unspecified back pain laterality, unspecified chronicity  methylPREDNISolone (Medrol Dospak) 4 mg tablets      7. BMI 30.0-30.9,adult               Long talk. Treatment options reviewed.    Discussed back pain. Take Flexeril and Methylprednisolone as discussed.  Advised patient to take Methylprednisolone as directed.  Take Flexeril as needed.  Educated on inflammation.  Continue to monitor.      Discussed importance of natural sources of nutrition.  Advised patient to consume vegetables, salads, fruits, nuts, and proteins such as fish and chicken.  Discussed portion control.      Hypertension controlled  Continue and take your medications as prescribed.    Health Maintenance issues discussed.    Importance of healthy diet and regular exercise regimen discussed.  Advance care planning discussed.  Importance of having a we will reviewed      Follow-up as instructed or sooner if any problems or symptoms do not resolve as expected.          Scribe Attestation  By signing my name below, Baylee MARKS Scribe   attest that this documentation has been prepared under the direction and in the presence of Adin Jones MD.

## 2024-05-22 ENCOUNTER — APPOINTMENT (OUTPATIENT)
Dept: CARDIOLOGY | Facility: CLINIC | Age: 64
End: 2024-05-22
Payer: MEDICARE

## 2024-05-29 ENCOUNTER — OFFICE VISIT (OUTPATIENT)
Dept: CARDIOLOGY | Facility: CLINIC | Age: 64
End: 2024-05-29
Payer: MEDICARE

## 2024-05-29 VITALS
DIASTOLIC BLOOD PRESSURE: 80 MMHG | BODY MASS INDEX: 31.22 KG/M2 | SYSTOLIC BLOOD PRESSURE: 158 MMHG | WEIGHT: 187.4 LBS | HEART RATE: 68 BPM | HEIGHT: 65 IN

## 2024-05-29 DIAGNOSIS — Z87.891 FORMER SMOKER: ICD-10-CM

## 2024-05-29 DIAGNOSIS — R00.2 PALPITATIONS: ICD-10-CM

## 2024-05-29 DIAGNOSIS — I10 BENIGN ESSENTIAL HYPERTENSION: ICD-10-CM

## 2024-05-29 PROCEDURE — 99214 OFFICE O/P EST MOD 30 MIN: CPT | Performed by: INTERNAL MEDICINE

## 2024-05-29 PROCEDURE — 1160F RVW MEDS BY RX/DR IN RCRD: CPT | Performed by: INTERNAL MEDICINE

## 2024-05-29 PROCEDURE — 3008F BODY MASS INDEX DOCD: CPT | Performed by: INTERNAL MEDICINE

## 2024-05-29 PROCEDURE — 1159F MED LIST DOCD IN RCRD: CPT | Performed by: INTERNAL MEDICINE

## 2024-05-29 PROCEDURE — 3077F SYST BP >= 140 MM HG: CPT | Performed by: INTERNAL MEDICINE

## 2024-05-29 PROCEDURE — 3079F DIAST BP 80-89 MM HG: CPT | Performed by: INTERNAL MEDICINE

## 2024-05-29 RX ORDER — LOSARTAN POTASSIUM 50 MG/1
75 TABLET ORAL DAILY
Qty: 135 TABLET | Refills: 3 | Status: SHIPPED | OUTPATIENT
Start: 2024-05-29 | End: 2025-05-29

## 2024-05-29 NOTE — PROGRESS NOTES
CARDIOLOGY OFFICE VISIT      CHIEF COMPLAINT  Chief Complaint   Patient presents with    Follow-up     3 month follow up and to review recent testing results        HISTORY OF PRESENT ILLNESS  Sommer Lewis is a 64 y.o. year old female patient with a history of hypertension and tobacco use we recently stopped smoking several months ago but unfortunately she has gained weight secondary to this.  She has a history of significant electrolyte abnormalities including low sodium and potassium secondary to thiazide diuretics.  We recently increased her losartan to 50 mg and she returns for follow-up.  Unfortunately blood pressure is still suboptimally controlled but she continues to be asymptomatic.  Carotid Dopplers from March 2024 shows 50 to 69% stenosis on the right with less than 50% stenosis on the left.  She continues to deny any lightheadedness, chest pain presyncope or syncope.  Recent lipids from February 2024 shows total cholesterol is 158, HDL is 56, LDL is 87 and triglycerides 75    ASSESSMENT AND PLAN  1.  Hypertension: Blood pressure is still suboptimally controlled so we will increase losartan to 75 mg daily and she will return for blood pressure check as scheduled.  Will also get a metabolic profile at that time.  2.  Problem List Items Addressed This Visit       Benign essential hypertension    Palpitations    Former smoker    BMI 31.0-31.9,adult       Recent Cardiovascular Testing:    Echo-  Stress-  Cath-  Carotid Ultrasound-    Past Medical History  Past Medical History:   Diagnosis Date    Acute bronchitis 11/20/2023    Hypertension 2022    Other specified symptoms and signs involving the circulatory and respiratory systems     Labile blood pressure    Personal history of other diseases of the respiratory system 06/27/2022    History of acute bronchitis       Social History  Social History     Tobacco Use    Smoking status: Former     Current packs/day: 0.00     Average packs/day: 1 pack/day for  "40.0 years (40.0 ttl pk-yrs)     Types: Cigarettes     Start date: 1983     Quit date: 2023     Years since quittin.4    Smokeless tobacco: Never   Vaping Use    Vaping status: Never Used   Substance Use Topics    Alcohol use: Not Currently    Drug use: Never       Family History     Family History   Problem Relation Name Age of Onset    Breast cancer Mother      Diabetes Father          Allergies:  Allergies   Allergen Reactions    Azithromycin Unknown    Hydrochlorothiazide Other    Moxifloxacin Unknown    Penicillins Unknown        Outpatient Medications:  Current Outpatient Medications   Medication Instructions    cyanocobalamin (VITAMIN B-12) 1,000 mcg, oral, Weekly    cyclobenzaprine (FLEXERIL) 10 mg, oral, 3 times daily PRN    losartan (COZAAR) 50 mg, oral, Daily    methylPREDNISolone (Medrol Dospak) 4 mg tablets Take as directed on package.        Recent Lab Results:    CBC:   Lab Results   Component Value Date    WBC 7.5 2024    RBC 4.76 2024    HGB 15.2 2024    HCT 45.4 2024     2024        CMP:    Lab Results   Component Value Date     2024    K 4.3 2024     2024    CO2 26 2024    BUN 14 2024    CREATININE 0.84 2024    GLUCOSE 94 2024    CALCIUM 9.9 2024       Magnesium:    No results found for: \"MG\"    Lipid Profile:    Lab Results   Component Value Date    TRIG 75 2024    HDL 56.0 2024    LDLCALC 87 2024       TSH:    Lab Results   Component Value Date    TSH 2.47 2023       BNP:   No results found for: \"BNP\"     PT/INR:    No results found for: \"PROTIME\", \"INR\"    HgBA1c:    No results found for: \"HGBA1C\"    BMP:  Lab Results   Component Value Date     2024     2023     10/04/2022     (L) 2022    K 4.3 2024    K 3.8 2023    K 4.1 10/04/2022    K 3.2 (L) 2022     2024     2023     (H) " "10/04/2022    CL 97 (L) 07/28/2022    CO2 26 02/22/2024    CO2 29 01/26/2023    CO2 25 10/04/2022    CO2 27 07/28/2022    BUN 14 02/22/2024    BUN 8 01/26/2023    BUN 7 10/04/2022    BUN 11 07/28/2022    CREATININE 0.84 02/22/2024    CREATININE 0.68 01/26/2023    CREATININE 0.75 10/04/2022    CREATININE 0.70 07/28/2022       CBC:  Lab Results   Component Value Date    WBC 7.5 02/22/2024    WBC 8.3 01/26/2023    WBC 7.1 07/28/2022    RBC 4.76 02/22/2024    RBC 4.74 01/26/2023    RBC 4.34 07/28/2022    HGB 15.2 02/22/2024    HGB 14.6 01/26/2023    HGB 13.3 07/28/2022    HCT 45.4 02/22/2024    HCT 44.4 01/26/2023    HCT 39.8 07/28/2022    MCV 95 02/22/2024    MCV 94 01/26/2023    MCV 92 07/28/2022    MCH 31.9 02/22/2024    MCHC 33.5 02/22/2024    MCHC 32.9 01/26/2023    MCHC 33.4 07/28/2022    RDW 12.2 02/22/2024    RDW 13.3 01/26/2023    RDW 12.7 07/28/2022     02/22/2024     01/26/2023     07/28/2022       Cardiac Enzymes:    No results found for: \"TROPHS\"    Hepatic Function Panel:    Lab Results   Component Value Date    ALKPHOS 84 02/22/2024    ALT 21 02/22/2024    AST 22 02/22/2024    PROT 7.0 02/22/2024    BILITOT 0.3 02/22/2024         REVIEW OF SYSTEMS  Review of Systems   Constitutional: Negative.   Cardiovascular: Negative.    Respiratory: Negative.     Neurological: Negative.    All other systems reviewed and are negative.      VITALS  Vitals:    05/29/24 0959   BP: 158/80   Pulse: 68     Wt Readings from Last 4 Encounters:   05/29/24 85 kg (187 lb 6.4 oz)   03/25/24 83 kg (183 lb)   02/21/24 82.1 kg (180 lb 14.4 oz)   12/12/23 79.5 kg (175 lb 3.2 oz)       PHYSICAL EXAM  Constitutional:       Appearance: Healthy appearance. Not in distress.   Eyes:      Conjunctiva/sclera: Conjunctivae normal.      Pupils: Pupils are equal, round, and reactive to light.   Neck:      Vascular: No JVR. JVD normal.   Pulmonary:      Effort: Pulmonary effort is normal.      Breath sounds: Normal breath " sounds. No wheezing. No rhonchi. No rales.   Chest:      Chest wall: Not tender to palpatation.   Cardiovascular:      PMI at left midclavicular line. Normal rate. Regular rhythm. Normal S1. Normal S2.       Murmurs: There is no murmur.      No gallop.  No click. No rub.   Pulses:     Intact distal pulses.   Edema:     Peripheral edema absent.   Abdominal:      Tenderness: There is no abdominal tenderness.   Musculoskeletal: Normal range of motion.         General: No tenderness.      Cervical back: Normal range of motion. Skin:     General: Skin is warm and dry.   Neurological:      General: No focal deficit present.      Mental Status: Alert and oriented to person, place and time.

## 2024-09-04 ENCOUNTER — APPOINTMENT (OUTPATIENT)
Dept: CARDIOLOGY | Facility: CLINIC | Age: 64
End: 2024-09-04
Payer: MEDICARE

## 2024-09-04 VITALS
DIASTOLIC BLOOD PRESSURE: 100 MMHG | HEIGHT: 65 IN | SYSTOLIC BLOOD PRESSURE: 150 MMHG | BODY MASS INDEX: 31.81 KG/M2 | WEIGHT: 190.9 LBS | HEART RATE: 60 BPM

## 2024-09-04 DIAGNOSIS — R06.02 SHORTNESS OF BREATH: ICD-10-CM

## 2024-09-04 DIAGNOSIS — R00.2 PALPITATIONS: ICD-10-CM

## 2024-09-04 DIAGNOSIS — I10 BENIGN ESSENTIAL HYPERTENSION: ICD-10-CM

## 2024-09-04 DIAGNOSIS — Z87.891 FORMER SMOKER: ICD-10-CM

## 2024-09-04 PROCEDURE — 3008F BODY MASS INDEX DOCD: CPT | Performed by: INTERNAL MEDICINE

## 2024-09-04 PROCEDURE — 3079F DIAST BP 80-89 MM HG: CPT | Performed by: INTERNAL MEDICINE

## 2024-09-04 PROCEDURE — 3077F SYST BP >= 140 MM HG: CPT | Performed by: INTERNAL MEDICINE

## 2024-09-04 PROCEDURE — 99214 OFFICE O/P EST MOD 30 MIN: CPT | Performed by: INTERNAL MEDICINE

## 2024-09-04 PROCEDURE — 1036F TOBACCO NON-USER: CPT | Performed by: INTERNAL MEDICINE

## 2024-09-04 PROCEDURE — 1159F MED LIST DOCD IN RCRD: CPT | Performed by: INTERNAL MEDICINE

## 2024-09-04 RX ORDER — LOSARTAN POTASSIUM 100 MG/1
100 TABLET ORAL DAILY
Qty: 90 TABLET | Refills: 3 | OUTPATIENT
Start: 2024-09-04 | End: 2025-09-04

## 2024-09-04 ASSESSMENT — ENCOUNTER SYMPTOMS
CONSTITUTIONAL NEGATIVE: 1
NEUROLOGICAL NEGATIVE: 1
RESPIRATORY NEGATIVE: 1
CARDIOVASCULAR NEGATIVE: 1

## 2024-09-04 NOTE — PROGRESS NOTES
CARDIOLOGY OFFICE VISIT      CHIEF COMPLAINT  Chief Complaint   Patient presents with    Follow-up     3-4 month follow up        HISTORY OF PRESENT ILLNESS  Sommer Lewis is a 64 y.o. year old female patient with history of hypertension and tobacco use who recently stopped smoking several months ago.  She has history of significant electrolyte abnormalities including low sodium and potassium secondary to thiazide diuretics.  Blood pressure has been suboptimally controlled and we recently increased her losartan from 50 to 75 mg daily and she now returns for follow-up.  She denies any chest pain or shortness of breath orthopnea proximal nocturnal dyspnea.  Carotids from March 2024 shows 50 to 69% stenosis on the right and less than 50% on the left.  She continues to be asymptomatic.  Lipids from February 2024 shows cholesterol is 158, HDL is 56, LDL is 87 triglycerides 75    ASSESSMENT AND PLAN  1.  Hypertension: Blood pressure is still suboptimally controlled so we will increase losartan to 100 mg daily and continue to monitor the blood pressure.  Will get metabolic profile as well as BMP prior to next follow-up.  2.  Dyslipidemia: With acceptable lipid profile as noted above.    Problem List Items Addressed This Visit       Benign essential hypertension    Palpitations    Former smoker    BMI 32.0-32.9,adult       Recent Cardiovascular Testing:    Echo-  Stress-  Cath-  Carotid Ultrasound-    Past Medical History  Past Medical History:   Diagnosis Date    Acute bronchitis 11/20/2023    Hypertension 2022    Other specified symptoms and signs involving the circulatory and respiratory systems     Labile blood pressure    Personal history of other diseases of the respiratory system 06/27/2022    History of acute bronchitis       Social History  Social History     Tobacco Use    Smoking status: Former     Current packs/day: 0.00     Average packs/day: 1 pack/day for 40.0 years (40.0 ttl pk-yrs)     Types:  "Cigarettes     Start date: 1983     Quit date: 2023     Years since quittin.7    Smokeless tobacco: Never   Vaping Use    Vaping status: Never Used   Substance Use Topics    Alcohol use: Not Currently    Drug use: Never       Family History     Family History   Problem Relation Name Age of Onset    Breast cancer Mother      Diabetes Father          Allergies:  Allergies   Allergen Reactions    Azithromycin Unknown    Hydrochlorothiazide Other    Moxifloxacin Unknown    Penicillins Unknown        Outpatient Medications:  Current Outpatient Medications   Medication Instructions    cyanocobalamin (VITAMIN B-12) 1,000 mcg, oral, Weekly (0600)    cyclobenzaprine (FLEXERIL) 10 mg, oral, 3 times daily PRN    losartan (COZAAR) 75 mg, oral, Daily    methylPREDNISolone (Medrol Dospak) 4 mg tablets Take as directed on package.        Recent Lab Results:    CBC:   Lab Results   Component Value Date    WBC 7.5 2024    RBC 4.76 2024    HGB 15.2 2024    HCT 45.4 2024     2024        CMP:    Lab Results   Component Value Date     2024    K 4.3 2024     2024    CO2 26 2024    BUN 14 2024    CREATININE 0.84 2024    GLUCOSE 94 2024    CALCIUM 9.9 2024       Magnesium:    No results found for: \"MG\"    Lipid Profile:    Lab Results   Component Value Date    TRIG 75 2024    HDL 56.0 2024    LDLCALC 87 2024       TSH:    Lab Results   Component Value Date    TSH 2.47 2023       BNP:   No results found for: \"BNP\"     PT/INR:    No results found for: \"PROTIME\", \"INR\"    HgBA1c:    No results found for: \"HGBA1C\"    BMP:  Lab Results   Component Value Date     2024     2023     10/04/2022     (L) 2022    K 4.3 2024    K 3.8 2023    K 4.1 10/04/2022    K 3.2 (L) 2022     2024     2023     (H) 10/04/2022    CL 97 (L) 2022 " "   CO2 26 02/22/2024    CO2 29 01/26/2023    CO2 25 10/04/2022    CO2 27 07/28/2022    BUN 14 02/22/2024    BUN 8 01/26/2023    BUN 7 10/04/2022    BUN 11 07/28/2022    CREATININE 0.84 02/22/2024    CREATININE 0.68 01/26/2023    CREATININE 0.75 10/04/2022    CREATININE 0.70 07/28/2022       CBC:  Lab Results   Component Value Date    WBC 7.5 02/22/2024    WBC 8.3 01/26/2023    WBC 7.1 07/28/2022    RBC 4.76 02/22/2024    RBC 4.74 01/26/2023    RBC 4.34 07/28/2022    HGB 15.2 02/22/2024    HGB 14.6 01/26/2023    HGB 13.3 07/28/2022    HCT 45.4 02/22/2024    HCT 44.4 01/26/2023    HCT 39.8 07/28/2022    MCV 95 02/22/2024    MCV 94 01/26/2023    MCV 92 07/28/2022    MCH 31.9 02/22/2024    MCHC 33.5 02/22/2024    MCHC 32.9 01/26/2023    MCHC 33.4 07/28/2022    RDW 12.2 02/22/2024    RDW 13.3 01/26/2023    RDW 12.7 07/28/2022     02/22/2024     01/26/2023     07/28/2022       Cardiac Enzymes:    No results found for: \"TROPHS\"    Hepatic Function Panel:    Lab Results   Component Value Date    ALKPHOS 84 02/22/2024    ALT 21 02/22/2024    AST 22 02/22/2024    PROT 7.0 02/22/2024    BILITOT 0.3 02/22/2024         REVIEW OF SYSTEMS  Review of Systems   Constitutional: Negative.   Cardiovascular: Negative.    Respiratory: Negative.     Neurological: Negative.    All other systems reviewed and are negative.      VITALS  Vitals:    09/04/24 0917   BP: 152/80   Pulse: 60     Wt Readings from Last 4 Encounters:   09/04/24 86.6 kg (190 lb 14.4 oz)   05/29/24 85 kg (187 lb 6.4 oz)   03/25/24 83 kg (183 lb)   02/21/24 82.1 kg (180 lb 14.4 oz)       PHYSICAL EXAM  Constitutional:       Appearance: Healthy appearance. Not in distress.   Eyes:      Conjunctiva/sclera: Conjunctivae normal.      Pupils: Pupils are equal, round, and reactive to light.   Neck:      Vascular: No JVR. JVD normal.   Pulmonary:      Effort: Pulmonary effort is normal.      Breath sounds: Normal breath sounds. No wheezing. No rhonchi. No " rales.   Chest:      Chest wall: Not tender to palpatation.   Cardiovascular:      PMI at left midclavicular line. Normal rate. Regular rhythm. Normal S1. Normal S2.       Murmurs: There is no murmur.      No gallop.  No click. No rub.   Pulses:     Intact distal pulses.   Edema:     Peripheral edema absent.   Abdominal:      Tenderness: There is no abdominal tenderness.   Musculoskeletal: Normal range of motion.         General: No tenderness.      Cervical back: Normal range of motion. Skin:     General: Skin is warm and dry.   Neurological:      General: No focal deficit present.      Mental Status: Alert and oriented to person, place and time.

## 2024-09-04 NOTE — PATIENT INSTRUCTIONS
Increase Losartan to  100 mg a day  BNP and BMP in 3 months  2-3 month visit    Patient educated on proper medication use.   Patient educated on risk factor modification.   Please bring any lab results from other providers / physicians to your next appointment.     Please bring all medicines, vitamins, and herbal supplements with you when you come to the office.     Prescriptions will not be filled unless you are compliant with your follow up appointments or have a follow up appointment scheduled as per instruction of your physician. Refills should be requested at the time of your visit.

## 2024-10-22 DIAGNOSIS — I10 BENIGN ESSENTIAL HYPERTENSION: ICD-10-CM

## 2024-10-22 NOTE — TELEPHONE ENCOUNTER
Received request for prescription refill for patient.  Patient follows with Dr. Sergei Mayes MD     Request is for losartan   Is patient currently on medication- yes    Last OV- 9/4/24  Next OV- 12/4/24    Pended for signing and sent to provider.

## 2024-10-23 RX ORDER — LOSARTAN POTASSIUM 100 MG/1
100 TABLET ORAL DAILY
Qty: 90 TABLET | Refills: 3 | Status: SHIPPED | OUTPATIENT
Start: 2024-10-23 | End: 2025-10-23

## 2024-12-02 ENCOUNTER — LAB (OUTPATIENT)
Dept: LAB | Facility: LAB | Age: 64
End: 2024-12-02
Payer: MEDICARE

## 2024-12-02 DIAGNOSIS — R00.2 PALPITATIONS: ICD-10-CM

## 2024-12-02 DIAGNOSIS — R06.02 SHORTNESS OF BREATH: ICD-10-CM

## 2024-12-02 DIAGNOSIS — I10 BENIGN ESSENTIAL HYPERTENSION: ICD-10-CM

## 2024-12-02 LAB
ANION GAP SERPL CALC-SCNC: 10 MMOL/L (ref 10–20)
BNP SERPL-MCNC: 114 PG/ML (ref 0–99)
BUN SERPL-MCNC: 12 MG/DL (ref 6–23)
CALCIUM SERPL-MCNC: 9.2 MG/DL (ref 8.6–10.3)
CHLORIDE SERPL-SCNC: 109 MMOL/L (ref 98–107)
CO2 SERPL-SCNC: 27 MMOL/L (ref 21–32)
CREAT SERPL-MCNC: 0.85 MG/DL (ref 0.5–1.05)
EGFRCR SERPLBLD CKD-EPI 2021: 76 ML/MIN/1.73M*2
GLUCOSE SERPL-MCNC: 116 MG/DL (ref 74–99)
POTASSIUM SERPL-SCNC: 4.5 MMOL/L (ref 3.5–5.3)
SODIUM SERPL-SCNC: 141 MMOL/L (ref 136–145)

## 2024-12-02 PROCEDURE — 36415 COLL VENOUS BLD VENIPUNCTURE: CPT

## 2024-12-02 PROCEDURE — 83880 ASSAY OF NATRIURETIC PEPTIDE: CPT

## 2024-12-02 PROCEDURE — 80048 BASIC METABOLIC PNL TOTAL CA: CPT

## 2024-12-04 ENCOUNTER — APPOINTMENT (OUTPATIENT)
Dept: CARDIOLOGY | Facility: CLINIC | Age: 64
End: 2024-12-04
Payer: MEDICARE

## 2024-12-04 VITALS
HEART RATE: 60 BPM | HEIGHT: 65 IN | DIASTOLIC BLOOD PRESSURE: 80 MMHG | SYSTOLIC BLOOD PRESSURE: 160 MMHG | WEIGHT: 187 LBS | BODY MASS INDEX: 31.16 KG/M2

## 2024-12-04 DIAGNOSIS — I10 BENIGN ESSENTIAL HYPERTENSION: Primary | ICD-10-CM

## 2024-12-04 DIAGNOSIS — Z87.891 FORMER SMOKER: ICD-10-CM

## 2024-12-04 DIAGNOSIS — R00.2 PALPITATIONS: ICD-10-CM

## 2024-12-04 PROCEDURE — 3077F SYST BP >= 140 MM HG: CPT | Performed by: INTERNAL MEDICINE

## 2024-12-04 PROCEDURE — 3078F DIAST BP <80 MM HG: CPT | Performed by: INTERNAL MEDICINE

## 2024-12-04 PROCEDURE — 3008F BODY MASS INDEX DOCD: CPT | Performed by: INTERNAL MEDICINE

## 2024-12-04 PROCEDURE — 99214 OFFICE O/P EST MOD 30 MIN: CPT | Performed by: INTERNAL MEDICINE

## 2024-12-04 PROCEDURE — 1159F MED LIST DOCD IN RCRD: CPT | Performed by: INTERNAL MEDICINE

## 2024-12-04 PROCEDURE — 1036F TOBACCO NON-USER: CPT | Performed by: INTERNAL MEDICINE

## 2024-12-04 RX ORDER — FLUTICASONE PROPIONATE 50 MCG
1 SPRAY, SUSPENSION (ML) NASAL AS NEEDED
COMMUNITY

## 2024-12-04 RX ORDER — HYDROCHLOROTHIAZIDE 25 MG/1
25 TABLET ORAL DAILY
Qty: 30 TABLET | Refills: 11 | Status: SHIPPED | OUTPATIENT
Start: 2024-12-04 | End: 2025-12-04

## 2024-12-04 ASSESSMENT — ENCOUNTER SYMPTOMS
CONSTITUTIONAL NEGATIVE: 1
RESPIRATORY NEGATIVE: 1
NEUROLOGICAL NEGATIVE: 1
CARDIOVASCULAR NEGATIVE: 1

## 2024-12-04 NOTE — PATIENT INSTRUCTIONS
Continue same medications and treatments.   Patient educated on proper medication use.   Patient educated on risk factor modification.   Please bring any lab results from other providers / physicians to your next appointment.     Please bring all medicines, vitamins, and herbal supplements with you when you come to the office.     Prescriptions will not be filled unless you are compliant with your follow up appointments or have a follow up appointment scheduled as per instruction of your physician. Refills should be requested at the time of your visit.    STOP LOSARTAN  START HYDROCHLOROTHIAZIDE 25 MG DAILY    OBTAIN LAB WORK IN 1 MONTH    FOLLOW UP IN 3 MONTHS    Cata MARKS LPN, am scribing for and in the presence of Dr. Sergei Mayes MD

## 2024-12-04 NOTE — PROGRESS NOTES
CARDIOLOGY OFFICE VISIT      CHIEF COMPLAINT  Chief Complaint   Patient presents with    Follow-up     3 month follow up        HISTORY OF PRESENT ILLNESS  Sommer Lewis is a 64 y.o. year old female patient with history of hypertension and tobacco use who recently stopped smoking several months ago.  She has history of significant electrolyte abnormalities including low sodium and potassium which was initially felt to be secondary to thiazide diuretic was listed as an allergic reaction.  However the patient did have some form of gastroenteritis at that time which explain the electrolyte abnormalities.  She said her blood pressure was much better controlled when she was on the thiazide diuretic and despite increasing her losartan, she has continued to have suboptimal blood pressure control.  Patient wishes to try the thiazide diuretics once again.    Lipids from February 2024 shows cholesterol is 158, HDL is 56, LDL is 87 triglycerides 75    ASSESSMENT AND PLAN  1.  Hypertension: Blood pressure is still suboptimally controlled despite increasing the losartan as noted above.  At this time, we will discontinue the losartan and try her on HCTZ 25 mg daily with close monitoring of her metabolic profile to  any early electrolyte abnormalities.  She will return for follow-up in 3 to 4 weeks with repeat BMP.  2.  Dyslipidemia: With acceptable lipid profile as noted above.    Problem List Items Addressed This Visit          Cardiac and Vasculature    Benign essential hypertension - Primary    Relevant Medications    hydroCHLOROthiazide (HYDRODiuril) 25 mg tablet    Other Relevant Orders    Comprehensive metabolic panel    Palpitations       Endocrine/Metabolic    BMI 31.0-31.9,adult       Tobacco    Former smoker       Recent Cardiovascular Testing:    Echo-  Stress-  Cath-  Carotid Ultrasound-    Past Medical History  Past Medical History:   Diagnosis Date    Acute bronchitis 11/20/2023    Hypertension 2022     "Other specified symptoms and signs involving the circulatory and respiratory systems     Labile blood pressure    Personal history of other diseases of the respiratory system 2022    History of acute bronchitis       Social History  Social History     Tobacco Use    Smoking status: Former     Current packs/day: 0.00     Average packs/day: 1 pack/day for 40.0 years (40.0 ttl pk-yrs)     Types: Cigarettes     Start date: 1983     Quit date: 2023     Years since quittin.0    Smokeless tobacco: Never   Vaping Use    Vaping status: Never Used   Substance Use Topics    Alcohol use: Not Currently    Drug use: Never       Family History     Family History   Problem Relation Name Age of Onset    Breast cancer Mother      Diabetes Father          Allergies:  Allergies   Allergen Reactions    Azithromycin Unknown    Hydrochlorothiazide Other    Moxifloxacin Unknown    Penicillins Unknown        Outpatient Medications:  Current Outpatient Medications   Medication Instructions    cyanocobalamin (VITAMIN B-12) 1,000 mcg, Weekly (0600)    cyclobenzaprine (FLEXERIL) 10 mg, oral, 3 times daily PRN    fluticasone (Flonase) 50 mcg/actuation nasal spray 1 spray, As needed    hydroCHLOROthiazide (HYDRODIURIL) 25 mg, oral, Daily    methylPREDNISolone (Medrol Dospak) 4 mg tablets Take as directed on package.        Recent Lab Results:    CBC:   Lab Results   Component Value Date    WBC 7.5 2024    RBC 4.76 2024    HGB 15.2 2024    HCT 45.4 2024     2024        CMP:    Lab Results   Component Value Date     2024    K 4.5 2024     (H) 2024    CO2 27 2024    BUN 12 2024    CREATININE 0.85 2024    GLUCOSE 116 (H) 2024    CALCIUM 9.2 2024       Magnesium:    No results found for: \"MG\"    Lipid Profile:    Lab Results   Component Value Date    TRIG 75 2024    HDL 56.0 2024    LDLCALC 87 2024       TSH:    Lab " "Results   Component Value Date    TSH 2.47 01/26/2023       BNP:   Lab Results   Component Value Date     (H) 12/02/2024        PT/INR:    No results found for: \"PROTIME\", \"INR\"    HgBA1c:    No results found for: \"HGBA1C\"    BMP:  Lab Results   Component Value Date     12/02/2024     02/22/2024     01/26/2023     10/04/2022     (L) 07/28/2022    K 4.5 12/02/2024    K 4.3 02/22/2024    K 3.8 01/26/2023    K 4.1 10/04/2022    K 3.2 (L) 07/28/2022     (H) 12/02/2024     02/22/2024     01/26/2023     (H) 10/04/2022    CL 97 (L) 07/28/2022    CO2 27 12/02/2024    CO2 26 02/22/2024    CO2 29 01/26/2023    CO2 25 10/04/2022    CO2 27 07/28/2022    BUN 12 12/02/2024    BUN 14 02/22/2024    BUN 8 01/26/2023    BUN 7 10/04/2022    BUN 11 07/28/2022    CREATININE 0.85 12/02/2024    CREATININE 0.84 02/22/2024    CREATININE 0.68 01/26/2023    CREATININE 0.75 10/04/2022    CREATININE 0.70 07/28/2022       CBC:  Lab Results   Component Value Date    WBC 7.5 02/22/2024    WBC 8.3 01/26/2023    WBC 7.1 07/28/2022    RBC 4.76 02/22/2024    RBC 4.74 01/26/2023    RBC 4.34 07/28/2022    HGB 15.2 02/22/2024    HGB 14.6 01/26/2023    HGB 13.3 07/28/2022    HCT 45.4 02/22/2024    HCT 44.4 01/26/2023    HCT 39.8 07/28/2022    MCV 95 02/22/2024    MCV 94 01/26/2023    MCV 92 07/28/2022    MCH 31.9 02/22/2024    MCHC 33.5 02/22/2024    MCHC 32.9 01/26/2023    MCHC 33.4 07/28/2022    RDW 12.2 02/22/2024    RDW 13.3 01/26/2023    RDW 12.7 07/28/2022     02/22/2024     01/26/2023     07/28/2022       Cardiac Enzymes:    No results found for: \"TROPHS\"    Hepatic Function Panel:    Lab Results   Component Value Date    ALKPHOS 84 02/22/2024    ALT 21 02/22/2024    AST 22 02/22/2024    PROT 7.0 02/22/2024    BILITOT 0.3 02/22/2024         REVIEW OF SYSTEMS  Review of Systems   Constitutional: Negative.   Cardiovascular: Negative.    Respiratory: Negative.   "   Neurological: Negative.    All other systems reviewed and are negative.      VITALS  Vitals:    12/04/24 1018   BP: 160/80   Pulse:      Wt Readings from Last 4 Encounters:   12/04/24 84.8 kg (187 lb)   09/04/24 86.6 kg (190 lb 14.4 oz)   05/29/24 85 kg (187 lb 6.4 oz)   03/25/24 83 kg (183 lb)       PHYSICAL EXAM  Constitutional:       Appearance: Healthy appearance. Not in distress.   Eyes:      Conjunctiva/sclera: Conjunctivae normal.      Pupils: Pupils are equal, round, and reactive to light.   Neck:      Vascular: No JVR. JVD normal.   Pulmonary:      Effort: Pulmonary effort is normal.      Breath sounds: Normal breath sounds. No wheezing. No rhonchi. No rales.   Chest:      Chest wall: Not tender to palpatation.   Cardiovascular:      PMI at left midclavicular line. Normal rate. Regular rhythm. Normal S1. Normal S2.       Murmurs: There is no murmur.      No gallop.  No click. No rub.   Pulses:     Intact distal pulses.   Edema:     Peripheral edema absent.   Abdominal:      General: Bowel sounds are normal.      Palpations: Abdomen is soft.      Tenderness: There is no abdominal tenderness.   Musculoskeletal: Normal range of motion.         General: No tenderness.      Cervical back: Normal range of motion. Skin:     General: Skin is warm and dry.   Neurological:      General: No focal deficit present.      Mental Status: Alert and oriented to person, place and time.

## 2024-12-17 ENCOUNTER — APPOINTMENT (OUTPATIENT)
Dept: OBSTETRICS AND GYNECOLOGY | Facility: CLINIC | Age: 64
End: 2024-12-17
Payer: MEDICARE

## 2024-12-17 VITALS
BODY MASS INDEX: 31.12 KG/M2 | HEIGHT: 65 IN | SYSTOLIC BLOOD PRESSURE: 142 MMHG | DIASTOLIC BLOOD PRESSURE: 78 MMHG | WEIGHT: 186.8 LBS

## 2024-12-17 DIAGNOSIS — Z01.419 NORMAL GYNECOLOGIC EXAMINATION: Primary | ICD-10-CM

## 2024-12-17 DIAGNOSIS — N90.7 VULVAR CYST: ICD-10-CM

## 2024-12-17 DIAGNOSIS — Z12.31 BREAST CANCER SCREENING BY MAMMOGRAM: ICD-10-CM

## 2024-12-17 DIAGNOSIS — R92.333 HETEROGENEOUSLY DENSE TISSUE OF BOTH BREASTS ON MAMMOGRAPHY: ICD-10-CM

## 2024-12-17 DIAGNOSIS — M81.0 OSTEOPOROSIS, POSTMENOPAUSAL: ICD-10-CM

## 2024-12-17 PROBLEM — R05.9 COUGH: Status: RESOLVED | Noted: 2023-11-20 | Resolved: 2024-12-17

## 2024-12-17 PROBLEM — Z76.89 ENCOUNTER TO ESTABLISH CARE WITH NEW DOCTOR: Status: RESOLVED | Noted: 2023-12-12 | Resolved: 2024-12-17

## 2024-12-17 PROBLEM — R21 RASH: Status: RESOLVED | Noted: 2023-11-20 | Resolved: 2024-12-17

## 2024-12-17 PROBLEM — R92.30 DENSE BREAST TISSUE ON MAMMOGRAM: Status: RESOLVED | Noted: 2023-11-20 | Resolved: 2024-12-17

## 2024-12-17 PROBLEM — E66.3 OVERWEIGHT (BMI 25.0-29.9): Status: RESOLVED | Noted: 2023-11-20 | Resolved: 2024-12-17

## 2024-12-17 PROCEDURE — 3077F SYST BP >= 140 MM HG: CPT | Performed by: OBSTETRICS & GYNECOLOGY

## 2024-12-17 PROCEDURE — G0101 CA SCREEN;PELVIC/BREAST EXAM: HCPCS | Performed by: OBSTETRICS & GYNECOLOGY

## 2024-12-17 PROCEDURE — 3078F DIAST BP <80 MM HG: CPT | Performed by: OBSTETRICS & GYNECOLOGY

## 2024-12-17 PROCEDURE — 1159F MED LIST DOCD IN RCRD: CPT | Performed by: OBSTETRICS & GYNECOLOGY

## 2024-12-17 PROCEDURE — 1036F TOBACCO NON-USER: CPT | Performed by: OBSTETRICS & GYNECOLOGY

## 2024-12-17 PROCEDURE — 3008F BODY MASS INDEX DOCD: CPT | Performed by: OBSTETRICS & GYNECOLOGY

## 2024-12-17 NOTE — PROGRESS NOTES
"GYNECOLOGY PROGRESS NOTE      CC:    Chief Complaint   Patient presents with    Annual Exam     Est patient - annual exam - no other issues  Mamm 1/2024 het dense  Dexa - none yet  Colon 2018  Chaperone ronan galarza ma        HPI:  Sommer Lewis is here for a routine GYN examination.  No GYN c/o, no AUB.  Quit smoking a year ago w/o relapses!      Depression screen:  negative  Fall screen:  negative  Domestic violence screen:  negative      ROS:  GI - no blood in BMs  URO - no hematuria  GYN - no AUB or vaginal discharge  PSYCH - mood OK        PHYSICAL EXAM:  /78 (BP Location: Left arm, Patient Position: Sitting)   Ht 1.638 m (5' 4.5\")   Wt 84.7 kg (186 lb 12.8 oz)   BMI 31.57 kg/m²   GEN:  A&O, NAD  HEENT:  normal dentition, no exposed gums  ABD:  NT/ND, soft, no palpable masses  URO:  atrophic urethral meatus, no bladder TTP  GYN:    -atrophic vulva w/o new lesions or ulcers, benign stable sebaceous cysts  -atrophic vagina without discharge or lesions  -normal cervix without lesions or discharge or CMT  -uterus NT/NE  -adnexa mobile and NT/NE  -perineum w/o lesions or ulcers  -rectal  no external hemorrhoids or lesions, internal exam deferred  BREAST:  no masses or TTP, no skin lesions or nipple discharge  PSYCH:  normal affect, non-anxious        IMPRESSION/PLAN:  Problem List Items Addressed This Visit       Vulvar cyst     Other Visit Diagnoses       Normal gynecologic examination    -  Primary    Breast cancer screening by mammogram        Relevant Orders    BI mammo bilateral screening tomosynthesis    Osteoporosis, postmenopausal        Relevant Orders    XR DEXA bone density    Heterogeneously dense tissue of both breasts on mammography               Pap/HPV no longer indicated as > age 65 w/o previous history of HGSIL.  Last pap/HPV wnl in 2020, prior pap in 2017 also \"normal\" (per patient at 2020 annual visit).  No indication for pap > age 65 in this patient as no Hx of HGSIL and previously " up to date with cervical CA screening prior to age 65 and not immunosuppressed.     Mammogram up to date and normal.  Breast density on last mammogram is heterogeneous.  Due to decreased sensitivity of mammogram screening with dense breasts a yearly CECILY is recommended for screening.   Alternate imaging discussed--and self-pay breast MRI not elected.      Colon CA screening:  colonoscopy in 2018 wnl.    DEXA scan none.  Osteoporosis risk factors=none.  Screening DEXA ordered.        Follow-up in 2 years for next Medicare GYN examination, patient is low risk and does NOT have indication for annual GYN examination.      Paulo Bennett, DO

## 2025-01-03 ENCOUNTER — LAB (OUTPATIENT)
Dept: LAB | Facility: LAB | Age: 65
End: 2025-01-03
Payer: MEDICARE

## 2025-01-03 DIAGNOSIS — I10 BENIGN ESSENTIAL HYPERTENSION: ICD-10-CM

## 2025-01-03 LAB
ALBUMIN SERPL BCP-MCNC: 4.4 G/DL (ref 3.4–5)
ALP SERPL-CCNC: 93 U/L (ref 33–136)
ALT SERPL W P-5'-P-CCNC: 23 U/L (ref 7–45)
ANION GAP SERPL CALC-SCNC: 10 MMOL/L (ref 10–20)
AST SERPL W P-5'-P-CCNC: 20 U/L (ref 9–39)
BILIRUB SERPL-MCNC: 0.5 MG/DL (ref 0–1.2)
BUN SERPL-MCNC: 12 MG/DL (ref 6–23)
CALCIUM SERPL-MCNC: 9.9 MG/DL (ref 8.6–10.3)
CHLORIDE SERPL-SCNC: 103 MMOL/L (ref 98–107)
CO2 SERPL-SCNC: 27 MMOL/L (ref 21–32)
CREAT SERPL-MCNC: 0.84 MG/DL (ref 0.5–1.05)
EGFRCR SERPLBLD CKD-EPI 2021: 77 ML/MIN/1.73M*2
GLUCOSE SERPL-MCNC: 108 MG/DL (ref 74–99)
POTASSIUM SERPL-SCNC: 4 MMOL/L (ref 3.5–5.3)
PROT SERPL-MCNC: 6.9 G/DL (ref 6.4–8.2)
SODIUM SERPL-SCNC: 136 MMOL/L (ref 136–145)

## 2025-01-03 PROCEDURE — 80053 COMPREHEN METABOLIC PANEL: CPT

## 2025-01-06 ENCOUNTER — TELEPHONE (OUTPATIENT)
Dept: CARDIOLOGY | Facility: CLINIC | Age: 65
End: 2025-01-06
Payer: MEDICARE

## 2025-01-06 NOTE — TELEPHONE ENCOUNTER
Patient called for results of labs and update on hydrochlorothiazide rx. Routed to Johnson Beauchamp LPN

## 2025-01-06 NOTE — TELEPHONE ENCOUNTER
I called [facility] and hydrochlorothiazide is ready to . I called patient and informed.   Labs are acceptable.

## 2025-01-06 NOTE — TELEPHONE ENCOUNTER
Per Dr Mayes labs are okay. CMP. Elevated glucose which PCP can address. I called patient and left message of lab results. Also left message as I do not see any hydrochlorothiazide. Left message to call me back about this.

## 2025-01-13 ENCOUNTER — HOSPITAL ENCOUNTER (OUTPATIENT)
Dept: RADIOLOGY | Facility: HOSPITAL | Age: 65
Discharge: HOME | End: 2025-01-13
Payer: MEDICARE

## 2025-01-13 VITALS — WEIGHT: 185 LBS | BODY MASS INDEX: 31.58 KG/M2 | HEIGHT: 64 IN

## 2025-01-13 DIAGNOSIS — M81.0 OSTEOPOROSIS, POSTMENOPAUSAL: ICD-10-CM

## 2025-01-13 DIAGNOSIS — Z12.31 BREAST CANCER SCREENING BY MAMMOGRAM: ICD-10-CM

## 2025-01-13 PROBLEM — M85.852 OSTEOPENIA OF NECKS OF BOTH FEMURS: Status: ACTIVE | Noted: 2025-01-13

## 2025-01-13 PROBLEM — M85.851 OSTEOPENIA OF NECKS OF BOTH FEMURS: Status: ACTIVE | Noted: 2025-01-13

## 2025-01-13 PROCEDURE — 77067 SCR MAMMO BI INCL CAD: CPT | Performed by: RADIOLOGY

## 2025-01-13 PROCEDURE — 77080 DXA BONE DENSITY AXIAL: CPT | Performed by: RADIOLOGY

## 2025-01-13 PROCEDURE — 77063 BREAST TOMOSYNTHESIS BI: CPT | Performed by: RADIOLOGY

## 2025-01-13 PROCEDURE — 77080 DXA BONE DENSITY AXIAL: CPT

## 2025-01-13 PROCEDURE — 77067 SCR MAMMO BI INCL CAD: CPT

## 2025-01-13 NOTE — RESULT ENCOUNTER NOTE
Call patient let her know that her DEXA scan results showed mild osteopenia.  She does NOT have an elevated fracture risk.  Treatment is indicated if she has osteoporosis or an elevated fracture risk--which she does not, based on the FRAX calculator done on her DEXA results.  Recommendation is repeat DEXA scan in 3 years, weight-bearing exercise (which can improve bone density), and avoidance of home hazards.

## 2025-01-14 ENCOUNTER — TELEPHONE (OUTPATIENT)
Dept: OBSTETRICS AND GYNECOLOGY | Facility: CLINIC | Age: 65
End: 2025-01-14
Payer: MEDICARE

## 2025-01-14 NOTE — TELEPHONE ENCOUNTER
Spoke with patient and made her aware of the results and Dr. Bennett recommendation.  Reviewed and approved by GIDEON PURCELL on 1/14/25 at 9:13 AM.

## 2025-01-14 NOTE — TELEPHONE ENCOUNTER
----- Message from Paulo Bennett sent at 1/13/2025  4:20 PM EST -----  Call patient let her know that her DEXA scan results showed mild osteopenia.  She does NOT have an elevated fracture risk.  Treatment is indicated if she has osteoporosis or an elevated fracture risk--which she does not, based on the FRAX calculator done on her DEXA results.  Recommendation is repeat DEXA scan in 3 years, weight-bearing exercise (which can improve bone density), and avoidance of home hazards.

## 2025-03-04 ENCOUNTER — APPOINTMENT (OUTPATIENT)
Dept: CARDIOLOGY | Facility: CLINIC | Age: 65
End: 2025-03-04
Payer: MEDICARE

## 2025-03-04 VITALS
DIASTOLIC BLOOD PRESSURE: 70 MMHG | SYSTOLIC BLOOD PRESSURE: 110 MMHG | HEART RATE: 72 BPM | BODY MASS INDEX: 33.12 KG/M2 | HEIGHT: 64 IN | WEIGHT: 194 LBS

## 2025-03-04 DIAGNOSIS — Z87.891 FORMER SMOKER: ICD-10-CM

## 2025-03-04 DIAGNOSIS — R00.2 PALPITATIONS: ICD-10-CM

## 2025-03-04 DIAGNOSIS — I10 BENIGN ESSENTIAL HYPERTENSION: ICD-10-CM

## 2025-03-04 PROCEDURE — 3008F BODY MASS INDEX DOCD: CPT | Performed by: INTERNAL MEDICINE

## 2025-03-04 PROCEDURE — 99214 OFFICE O/P EST MOD 30 MIN: CPT | Performed by: INTERNAL MEDICINE

## 2025-03-04 PROCEDURE — 1159F MED LIST DOCD IN RCRD: CPT | Performed by: INTERNAL MEDICINE

## 2025-03-04 PROCEDURE — 3074F SYST BP LT 130 MM HG: CPT | Performed by: INTERNAL MEDICINE

## 2025-03-04 PROCEDURE — 3078F DIAST BP <80 MM HG: CPT | Performed by: INTERNAL MEDICINE

## 2025-03-04 ASSESSMENT — ENCOUNTER SYMPTOMS
CONSTITUTIONAL NEGATIVE: 1
NEUROLOGICAL NEGATIVE: 1
CARDIOVASCULAR NEGATIVE: 1
RESPIRATORY NEGATIVE: 1

## 2025-03-04 NOTE — PROGRESS NOTES
CARDIOLOGY OFFICE VISIT      CHIEF COMPLAINT  Chief Complaint   Patient presents with    Follow-up     Pt. Here for a 3 month follow up and to go over CMP        HISTORY OF PRESENT ILLNESS  Sommer Lewis is a 64 y.o. year old female patient with hypertension, tobacco abuse who recently stopped smoking.  She will place only on HCTZ but had hyponatremia and low potassium levels felt to be secondary to this.  However this was in the setting of gastroenteritis at that time and then she was rechallenged with HCTZ since her last visit and she returns for follow-up.  Her blood pressure has been well-controlled since then and she has not had any significant electrolyte abnormalities.  Labs from January 3, 2025 shows her sodium was 136, potassium is 4 with a BUN of 12 and creatinine of 0.84    ASSESSMENT AND PLAN  1.  Hypertension: Blood pressure is well-controlled on current dose of HCTZ which we will continue.  Will get a repeat metabolic profile prior to next visit.  2.  Dyslipidemia: I have personally reviewed her lipid profile from 2020 which shows an LDL of 87 with a total cholesterol of 158.  We will continue with dietary management of lipid status.    Problem List Items Addressed This Visit       Benign essential hypertension    Palpitations    Former smoker    BMI 33.0-33.9,adult       Recent Cardiovascular Testing:    Echo-  Stress-  Cath-  Carotid Ultrasound-    Past Medical History  Past Medical History:   Diagnosis Date    Hypertension     Vitamin B12 deficiency 2023       Social History  Social History     Tobacco Use    Smoking status: Former     Current packs/day: 0.00     Average packs/day: 1 pack/day for 40.0 years (40.0 ttl pk-yrs)     Types: Cigarettes     Start date: 1983     Quit date: 2023     Years since quittin.2    Smokeless tobacco: Never   Vaping Use    Vaping status: Never Used   Substance Use Topics    Alcohol use: Not Currently    Drug use: Never       Family  "History     Family History   Problem Relation Name Age of Onset    Breast cancer Mother      Diabetes Father          Allergies:  Allergies   Allergen Reactions    Azithromycin Unknown    Hydrochlorothiazide Other    Moxifloxacin Unknown    Penicillins Unknown        Outpatient Medications:  Current Outpatient Medications   Medication Instructions    cyanocobalamin (VITAMIN B-12) 1,000 mcg, Weekly (0600)    cyclobenzaprine (FLEXERIL) 10 mg, oral, 3 times daily PRN    fluticasone (Flonase) 50 mcg/actuation nasal spray 1 spray, As needed    hydroCHLOROthiazide (HYDRODIURIL) 25 mg, oral, Daily        Recent Lab Results:  I have personally reviewed the below labs noted;     CBC:   Lab Results   Component Value Date    WBC 7.5 02/22/2024    RBC 4.76 02/22/2024    HGB 15.2 02/22/2024    HCT 45.4 02/22/2024     02/22/2024        CMP:    Lab Results   Component Value Date     01/03/2025    K 4.0 01/03/2025     01/03/2025    CO2 27 01/03/2025    BUN 12 01/03/2025    CREATININE 0.84 01/03/2025    GLUCOSE 108 (H) 01/03/2025    CALCIUM 9.9 01/03/2025       Magnesium:    No results found for: \"MG\"    Lipid Profile:    Lab Results   Component Value Date    TRIG 75 02/22/2024    HDL 56.0 02/22/2024    LDLCALC 87 02/22/2024       TSH:    Lab Results   Component Value Date    TSH 2.47 01/26/2023       BNP:   Lab Results   Component Value Date     (H) 12/02/2024        PT/INR:    No results found for: \"PROTIME\", \"INR\"    HgBA1c:    No results found for: \"HGBA1C\"    BMP:  Lab Results   Component Value Date     01/03/2025     12/02/2024     02/22/2024    K 4.0 01/03/2025    K 4.5 12/02/2024    K 4.3 02/22/2024     01/03/2025     (H) 12/02/2024     02/22/2024    CO2 27 01/03/2025    CO2 27 12/02/2024    CO2 26 02/22/2024    BUN 12 01/03/2025    BUN 12 12/02/2024    BUN 14 02/22/2024    CREATININE 0.84 01/03/2025    CREATININE 0.85 12/02/2024    CREATININE 0.84 02/22/2024 " "      CBC:  Lab Results   Component Value Date    WBC 7.5 02/22/2024    WBC 8.3 01/26/2023    WBC 7.1 07/28/2022    RBC 4.76 02/22/2024    RBC 4.74 01/26/2023    RBC 4.34 07/28/2022    HGB 15.2 02/22/2024    HGB 14.6 01/26/2023    HGB 13.3 07/28/2022    HCT 45.4 02/22/2024    HCT 44.4 01/26/2023    HCT 39.8 07/28/2022    MCV 95 02/22/2024    MCV 94 01/26/2023    MCV 92 07/28/2022    MCH 31.9 02/22/2024    MCHC 33.5 02/22/2024    MCHC 32.9 01/26/2023    MCHC 33.4 07/28/2022    RDW 12.2 02/22/2024    RDW 13.3 01/26/2023    RDW 12.7 07/28/2022     02/22/2024     01/26/2023     07/28/2022       Cardiac Enzymes:    No results found for: \"TROPHS\"    Hepatic Function Panel:    Lab Results   Component Value Date    ALKPHOS 93 01/03/2025    ALT 23 01/03/2025    AST 20 01/03/2025    PROT 6.9 01/03/2025    BILITOT 0.5 01/03/2025         REVIEW OF SYSTEMS  Review of Systems   Constitutional: Negative.   Cardiovascular: Negative.    Respiratory: Negative.     Neurological: Negative.    All other systems reviewed and are negative.      VITALS  Vitals:    03/04/25 1101   BP: 110/70   Pulse: 72     Wt Readings from Last 4 Encounters:   03/04/25 88 kg (194 lb)   01/13/25 83.9 kg (185 lb)   12/17/24 84.7 kg (186 lb 12.8 oz)   12/04/24 84.8 kg (187 lb)       PHYSICAL EXAM  Constitutional:       Appearance: Healthy appearance. Not in distress.   Eyes:      Conjunctiva/sclera: Conjunctivae normal.      Pupils: Pupils are equal, round, and reactive to light.   Neck:      Vascular: No JVR. JVD normal.   Pulmonary:      Effort: Pulmonary effort is normal.      Breath sounds: Normal breath sounds. No wheezing. No rhonchi. No rales.   Chest:      Chest wall: Not tender to palpatation.   Cardiovascular:      PMI at left midclavicular line. Normal rate. Regular rhythm. Normal S1. Normal S2.       Murmurs: There is no murmur.      No gallop.  No click. No rub.   Pulses:     Intact distal pulses.   Edema:     Peripheral " edema absent.   Abdominal:      Tenderness: There is no abdominal tenderness.   Musculoskeletal: Normal range of motion.         General: No tenderness.      Cervical back: Normal range of motion. Skin:     General: Skin is warm and dry.   Neurological:      General: No focal deficit present.      Mental Status: Alert and oriented to person, place and time.           IJohnson LPN   am scribing for, and in the presence of Dr. Sergei Mayes MD  .    I, Dr. Sergei Mayes MD  , personally performed the services described in the documentation as scribed by Johnson Beauchamp LPN   in my presence, and confirm it is both accurate and complete.

## 2025-04-16 ENCOUNTER — OFFICE VISIT (OUTPATIENT)
Dept: PRIMARY CARE | Facility: CLINIC | Age: 65
End: 2025-04-16
Payer: MEDICARE

## 2025-04-16 VITALS
DIASTOLIC BLOOD PRESSURE: 80 MMHG | BODY MASS INDEX: 32.78 KG/M2 | SYSTOLIC BLOOD PRESSURE: 130 MMHG | WEIGHT: 192 LBS | RESPIRATION RATE: 18 BRPM | HEIGHT: 64 IN

## 2025-04-16 DIAGNOSIS — J40 BRONCHITIS: Primary | ICD-10-CM

## 2025-04-16 DIAGNOSIS — R09.81 HEAD CONGESTION: ICD-10-CM

## 2025-04-16 DIAGNOSIS — R05.1 ACUTE COUGH: ICD-10-CM

## 2025-04-16 DIAGNOSIS — I10 BENIGN ESSENTIAL HYPERTENSION: ICD-10-CM

## 2025-04-16 DIAGNOSIS — J02.9 SORE THROAT: ICD-10-CM

## 2025-04-16 PROCEDURE — 3008F BODY MASS INDEX DOCD: CPT | Performed by: FAMILY MEDICINE

## 2025-04-16 PROCEDURE — 3075F SYST BP GE 130 - 139MM HG: CPT | Performed by: FAMILY MEDICINE

## 2025-04-16 PROCEDURE — 99213 OFFICE O/P EST LOW 20 MIN: CPT | Performed by: FAMILY MEDICINE

## 2025-04-16 PROCEDURE — 1036F TOBACCO NON-USER: CPT | Performed by: FAMILY MEDICINE

## 2025-04-16 PROCEDURE — 3079F DIAST BP 80-89 MM HG: CPT | Performed by: FAMILY MEDICINE

## 2025-04-16 PROCEDURE — 1159F MED LIST DOCD IN RCRD: CPT | Performed by: FAMILY MEDICINE

## 2025-04-16 RX ORDER — DOXYCYCLINE 100 MG/1
100 CAPSULE ORAL 2 TIMES DAILY
Qty: 20 CAPSULE | Refills: 0 | Status: SHIPPED | OUTPATIENT
Start: 2025-04-16 | End: 2025-04-26

## 2025-04-16 NOTE — PROGRESS NOTES
"Subjective   Patient ID: Sommer Lewis is a 65 y.o. female who presents for head congestion, Cough, and Sore Throat.  HPI    Patient presents in office today for cold symptoms. Ongoing x 1 week. Symptoms include cough, congestion, sore throat. Patient has tried alegra d OTC.     Taking current medications which were reviewed.  Problem list discussed.    Eating okay.  Staying active.    Has no other new problem /question.     ROS  Constitutional- No activity change. No appetite change.  Respiratory- congestion, cough  Cardiovascular- No palpitations. No chest pain.  GI- No nausea or vomiting. No diarrhea or constipation. Denies abdominal pain.  Musculoskeletal- Denies joint swelling.  Neurological- Denies headaches. Denies dizziness.  Skin- No rashes.    Objective     /80   Resp 18   Ht 1.626 m (5' 4\")   Wt 87.1 kg (192 lb)   BMI 32.96 kg/m²     Allergies[1]    Constitutional-- Well-nourished.  No distress  Head- unremarkable.  Ears- TMs clear.  Eyes- PERRL.  Conjunctiva normal.  Nose- significant for clear rhinorrhea and mild bilateral maxillary sinus pressure  Throat- Oropharynx is mildly inflamed  Neck- Supple with no thyromegaly.  Mild anterior cervical adenopathy noted.  Pulmonary/Chest- Breath sounds with normal effort.  Trace upper airway rhonchi  Heart- Regular rate and rhythm.  No murmur.  Abdomen- Soft and non-tender.  No masses noted.  Extremities- no edema.      Assessment/Plan   1. Bronchitis  doxycycline (Vibramycin) 100 mg capsule      2. Sore throat        3. Acute cough        4. Head congestion        5. Benign essential hypertension               Long talk. Treatment options reviewed.    Discussed bronchitis and related symptoms. Take Doxycycline as discussed. Continue to monitor.     Continue and take your medications as prescribed.    Health Maintenance issues discussed.    Importance of healthy diet and regular exercise regimen discussed.      Follow-up as instructed or sooner if " any problems or symptoms do not resolve as expected.    All medical record entries made by the Scribe were at my direction and personally dictated by me.    I have reviewed the chart and agree that the record accurately reflects my personal performance of the history, physical exam, discussion, and plan.        Scribe Attestation  By signing my name below, I, Mike Garcia   attest that this documentation has been prepared under the direction and in the presence of Adin Jones MD.         [1]   Allergies  Allergen Reactions    Azithromycin Unknown    Moxifloxacin Unknown    Penicillins Unknown

## 2025-04-23 ENCOUNTER — APPOINTMENT (OUTPATIENT)
Dept: PRIMARY CARE | Facility: CLINIC | Age: 65
End: 2025-04-23
Payer: MEDICARE

## 2025-07-01 DIAGNOSIS — I10 BENIGN ESSENTIAL HYPERTENSION: ICD-10-CM

## 2025-07-01 DIAGNOSIS — R00.2 PALPITATIONS: ICD-10-CM

## 2025-09-02 ENCOUNTER — APPOINTMENT (OUTPATIENT)
Dept: CARDIOLOGY | Facility: CLINIC | Age: 65
End: 2025-09-02
Payer: MEDICARE

## 2025-09-02 LAB
ANION GAP SERPL CALCULATED.4IONS-SCNC: 12 MMOL/L (CALC) (ref 7–17)
BUN SERPL-MCNC: 13 MG/DL (ref 7–25)
BUN/CREAT SERPL: ABNORMAL (CALC) (ref 6–22)
CALCIUM SERPL-MCNC: 9.8 MG/DL (ref 8.6–10.4)
CHLORIDE SERPL-SCNC: 103 MMOL/L (ref 98–110)
CO2 SERPL-SCNC: 24 MMOL/L (ref 20–32)
CREAT SERPL-MCNC: 0.85 MG/DL (ref 0.5–1.05)
EGFRCR SERPLBLD CKD-EPI 2021: 75 ML/MIN/1.73M2
GLUCOSE SERPL-MCNC: 109 MG/DL (ref 65–99)
POTASSIUM SERPL-SCNC: 4.1 MMOL/L (ref 3.5–5.3)
SODIUM SERPL-SCNC: 139 MMOL/L (ref 135–146)

## 2025-09-02 ASSESSMENT — ENCOUNTER SYMPTOMS
RESPIRATORY NEGATIVE: 1
CONSTITUTIONAL NEGATIVE: 1
CARDIOVASCULAR NEGATIVE: 1
NEUROLOGICAL NEGATIVE: 1

## 2025-09-03 ENCOUNTER — TELEPHONE (OUTPATIENT)
Dept: CARDIOLOGY | Facility: CLINIC | Age: 65
End: 2025-09-03
Payer: MEDICARE

## 2026-03-03 ENCOUNTER — APPOINTMENT (OUTPATIENT)
Dept: CARDIOLOGY | Facility: CLINIC | Age: 66
End: 2026-03-03
Payer: MEDICARE